# Patient Record
Sex: FEMALE | HISPANIC OR LATINO | Employment: FULL TIME | URBAN - METROPOLITAN AREA
[De-identification: names, ages, dates, MRNs, and addresses within clinical notes are randomized per-mention and may not be internally consistent; named-entity substitution may affect disease eponyms.]

---

## 2017-01-24 ENCOUNTER — HOSPITAL ENCOUNTER (OUTPATIENT)
Dept: RADIOLOGY | Facility: MEDICAL CENTER | Age: 47
End: 2017-01-24
Attending: SURGERY
Payer: COMMERCIAL

## 2017-01-24 DIAGNOSIS — G54.0 THORACIC OUTLET SYNDROME: ICD-10-CM

## 2017-01-24 PROCEDURE — 71020 DX-CHEST-2 VIEWS: CPT

## 2017-01-27 ENCOUNTER — HOSPITAL ENCOUNTER (OUTPATIENT)
Dept: RADIOLOGY | Facility: MEDICAL CENTER | Age: 47
End: 2017-01-27
Attending: INTERNAL MEDICINE
Payer: COMMERCIAL

## 2017-01-27 DIAGNOSIS — R92.8 ABNORMAL MAMMOGRAM: ICD-10-CM

## 2017-01-27 DIAGNOSIS — R52 PAIN: ICD-10-CM

## 2017-01-27 PROCEDURE — G0204 DX MAMMO INCL CAD BI: HCPCS

## 2017-04-12 ENCOUNTER — NON-PROVIDER VISIT (OUTPATIENT)
Dept: URGENT CARE | Facility: PHYSICIAN GROUP | Age: 47
End: 2017-04-12

## 2017-04-12 DIAGNOSIS — Z11.1 SPECIAL SCREENING EXAMINATION FOR RESPIRATORY TUBERCULOSIS: ICD-10-CM

## 2017-04-12 PROCEDURE — 86580 TB INTRADERMAL TEST: CPT | Performed by: PHYSICIAN ASSISTANT

## 2017-04-14 ENCOUNTER — NON-PROVIDER VISIT (OUTPATIENT)
Dept: URGENT CARE | Facility: PHYSICIAN GROUP | Age: 47
End: 2017-04-14

## 2017-04-14 ENCOUNTER — APPOINTMENT (OUTPATIENT)
Dept: RADIOLOGY | Facility: IMAGING CENTER | Age: 47
End: 2017-04-14
Attending: PHYSICIAN ASSISTANT
Payer: COMMERCIAL

## 2017-04-14 DIAGNOSIS — R76.11 POSITIVE TB TEST: ICD-10-CM

## 2017-04-14 LAB — TB WHEAL 3D P 5 TU DIAM: NORMAL MM

## 2017-04-14 PROCEDURE — 71020 DX-CHEST-2 VIEWS: CPT | Mod: TC | Performed by: PHYSICIAN ASSISTANT

## 2017-08-18 ENCOUNTER — OFFICE VISIT (OUTPATIENT)
Dept: URGENT CARE | Facility: PHYSICIAN GROUP | Age: 47
End: 2017-08-18
Payer: COMMERCIAL

## 2017-08-18 VITALS
HEART RATE: 96 BPM | OXYGEN SATURATION: 98 % | SYSTOLIC BLOOD PRESSURE: 118 MMHG | HEIGHT: 62 IN | BODY MASS INDEX: 25.76 KG/M2 | WEIGHT: 140 LBS | TEMPERATURE: 98.4 F | DIASTOLIC BLOOD PRESSURE: 74 MMHG | RESPIRATION RATE: 14 BRPM

## 2017-08-18 DIAGNOSIS — H66.003 ACUTE SUPPURATIVE OTITIS MEDIA OF BOTH EARS WITHOUT SPONTANEOUS RUPTURE OF TYMPANIC MEMBRANES, RECURRENCE NOT SPECIFIED: ICD-10-CM

## 2017-08-18 DIAGNOSIS — J02.9 SORE THROAT: ICD-10-CM

## 2017-08-18 DIAGNOSIS — J06.9 UPPER RESPIRATORY TRACT INFECTION, UNSPECIFIED TYPE: ICD-10-CM

## 2017-08-18 PROCEDURE — 99214 OFFICE O/P EST MOD 30 MIN: CPT | Performed by: PHYSICIAN ASSISTANT

## 2017-08-18 RX ORDER — AMOXICILLIN AND CLAVULANATE POTASSIUM 875; 125 MG/1; MG/1
1 TABLET, FILM COATED ORAL 2 TIMES DAILY
Qty: 14 TAB | Refills: 0 | Status: SHIPPED | OUTPATIENT
Start: 2017-08-18 | End: 2017-08-25

## 2017-08-18 ASSESSMENT — ENCOUNTER SYMPTOMS
DIZZINESS: 0
FEVER: 0
CHILLS: 1
DIARRHEA: 0
VOMITING: 0
SHORTNESS OF BREATH: 0
EYE REDNESS: 0
TINGLING: 0
WHEEZING: 0
MYALGIAS: 0
RHINORRHEA: 1
COUGH: 1
HEADACHES: 0
NECK PAIN: 0
ABDOMINAL PAIN: 0
SPUTUM PRODUCTION: 1
EYE DISCHARGE: 0
SINUS PAIN: 0
SORE THROAT: 1

## 2017-08-18 NOTE — MR AVS SNAPSHOT
"        Radha Marreropravin   2017 6:15 PM   Office Visit   MRN: [id number]    Department:  [facility]   Dept Phone:  [phone]    Description:  Female : 1970   Provider:  Mele Cutler PA-C           Reason for Visit     Sore Throat sore throat; body aches x4 days. Pt feels \"fatigued\"       Allergies as of 2017     No Known Allergies      You were diagnosed with     Acute suppurative otitis media of both ears without spontaneous rupture of tympanic membranes, recurrence not specified   [4909403]       Sore throat   [397524]       Upper respiratory tract infection, unspecified type   [8526954]         Vital Signs     Blood Pressure Pulse Temperature Respirations Height Weight    118/74 mmHg 96 36.9 °C (98.4 °F) 14 1.575 m (5' 2.01\") 63.504 kg (140 lb)    Body Mass Index Oxygen Saturation Smoking Status             25.60 kg/m2 98% Former Smoker         Basic Information     Date Of Birth Sex Race Ethnicity Preferred Language    1970 Female  or   Origin (Puerto Rican,Pakistani,Bahraini,Miguel, etc) English      Your appointments     2018  4:20 PM   MA SCRN10 with [facility] MG 3   [facility] ([address])    [address] [place] [id number] [phone]           No deodorant, powder, perfume or lotion under the arm or breast area.              Problem List              ICD-10-CM Priority Class Noted - Resolved    Chronic neck pain M54.2, G89.29   Unknown - Present    Chronic low back pain M54.5, G89.29   Unknown - Present    Chronic right shoulder pain M25.511, G89.29   Unknown - Present    Fibromyalgia M79.7   Unknown - Present    Displacement of lumbar intervertebral disc without myelopathy M51.26   10/10/2014 - Present    Vitamin D deficiency E55.9   7/15/2015 - Present    Intractable chronic migraine without aura G43.719   2016 - Present      Health Maintenance        Date Due Completion Dates    IMM DTaP/Tdap/Td " Vaccine (1 - Tdap) 3/27/1988 ---    PAP SMEAR 3/27/1990 ---    IMM INFLUENZA (1) 9/1/2017 9/19/2014    MAMMOGRAM 1/27/2018 1/27/2017, 6/27/2016, 12/24/2015, 12/8/2015, 10/4/2010, 3/30/2009, 3/30/2009            Current Immunizations     Influenza TIV (IM) 9/19/2014    Tuberculin Skin Test 4/12/2017      Below and/or attached are the medications your provider expects you to take. Review all of your home medications and newly ordered medications with your provider and/or pharmacist. Follow medication instructions as directed by your provider and/or pharmacist. Please keep your medication list with you and share with your provider. Update the information when medications are discontinued, doses are changed, or new medications (including over-the-counter products) are added; and carry medication information at all times in the event of emergency situations     Allergies:  No Known Allergies          Medications  Valid as of: August 18, 2017 -  6:59 PM    Generic Name Brand Name Tablet Size Instructions for use    Albuterol Sulfate (Aero Soln) albuterol 108 (90 Base) MCG/ACT Inhale 1 Puff by mouth every 6 hours as needed for Shortness of Breath.        Amitriptyline HCl (Tab) ELAVIL 25 MG Take 1 Tab by mouth every bedtime.        Amoxicillin-Pot Clavulanate (Tab) AUGMENTIN 875-125 MG Take 1 Tab by mouth 2 times a day for 7 days.        Azithromycin (Tab) ZITHROMAX 250 MG Take as directed on package. 1 pack.        Benzonatate (Cap) TESSALON 200 MG Take 1 Cap by mouth 3 times a day as needed for Cough.        Fluticasone Propionate (Suspension) FLONASE 50 MCG/ACT Spray 2 Sprays in nose every day.        Loratadine (Tab) CLARITIN 10 MG Take 10 mg by mouth every day.        mag hydrox-al hydrox-simeth-diphenhydrAMINE-lidocaine viscous 2%   Swish, gargle, and spit, one to two teaspoonfuls every six hours as needed. Shake well before using.        .                 Medicines prescribed today were sent to:     [id number] [address] [place] [address]    Phone: [phone] Fax: [phone]    Open 24 Hours?: No      Medication refill instructions:       If your prescription bottle indicates you have medication refills left, it is not necessary to call your provider’s office. Please contact your pharmacy and they will refill your medication.    If your prescription bottle indicates you do not have any refills left, you may request refills at any time through one of the following ways: The online [facility] system (except Urgent Care), by calling your provider’s office, or by asking your pharmacy to contact your provider’s office with a refill request. Medication refills are processed only during regular business hours and may not be available until the next business day. Your provider may request additional information or to have a follow-up visit with you prior to refilling your medication.   *Please Note: Medication refills are assigned a new Rx number when refilled electronically. Your pharmacy may indicate that no refills were authorized even though a new prescription for the same medication is available at the pharmacy. Please request the medicine by name with the pharmacy before contacting your provider for a refill.           [facility]ways Access Code: Activation code not generated  Current [facility]ways Status: Active

## 2017-08-19 NOTE — PROGRESS NOTES
"Subjective:      Radha Blank is a 47 y.o. female who presents with Sore Throat            URI   This is a new problem. Episode onset: 1 week. The problem has been gradually worsening. There has been no fever. Associated symptoms include congestion, coughing, ear pain, a plugged ear sensation, rhinorrhea and a sore throat. Pertinent negatives include no abdominal pain, chest pain, diarrhea, dysuria, headaches, joint pain, joint swelling, neck pain, rash, sinus pain, sneezing, vomiting or wheezing. She has tried NSAIDs and increased fluids for the symptoms. The treatment provided mild relief.       Review of Systems   Constitutional: Positive for chills and malaise/fatigue. Negative for fever.   HENT: Positive for congestion, ear pain, rhinorrhea and sore throat. Negative for ear discharge and sneezing.    Eyes: Negative for discharge and redness.   Respiratory: Positive for cough and sputum production. Negative for shortness of breath and wheezing.    Cardiovascular: Negative for chest pain and leg swelling.   Gastrointestinal: Negative for vomiting, abdominal pain and diarrhea.   Genitourinary: Negative for dysuria and urgency.   Musculoskeletal: Negative for myalgias, joint pain and neck pain.   Skin: Negative for itching and rash.   Neurological: Negative for dizziness, tingling and headaches.          Objective:     /74 mmHg  Pulse 96  Temp(Src) 36.9 °C (98.4 °F)  Resp 14  Ht 1.575 m (5' 2.01\")  Wt 63.504 kg (140 lb)  BMI 25.60 kg/m2  SpO2 98%   PMH:  has a past medical history of Fibromyalgia; Anxiety; Depression; Arrhythmia; Arthritis; Headache(784.0); Tuberculosis (1990s); Ulcer (CMS-HCC); and Urinary tract infection, site not specified. She also has no past medical history of Diabetes, CATARACT, Angina, ASTHMA, COPD, Dialysis, Anesthesia, Heart burn, Indigestion, Pap smear, Allergy, unspecified not elsewhere classified, Anemia, Asthma, Blood transfusion, without reported diagnosis, " Unspecified cataract, CHF (congestive heart failure) (CMS-HCC), Clotting disorder (CMS-HCC), Chronic airway obstruction, not elsewhere classified (CMS-HCC), Emphysema, Diabetic neuropathy (CMS-HCC), Type II or unspecified type diabetes mellitus without mention of complication, not stated as uncontrolled, GERD (gastroesophageal reflux disease), Glaucoma, Goiter, Headache, classical migraine, Heart attack (CMS-HCC), Heart murmur, Asymptomatic human immunodeficiency virus (HIV) infection status (CMS-HCC), Hyperlipidemia, IBD (inflammatory bowel disease), Hypertension, Kidney disease, Meningitis, Seizure (CMS-HCC), Stroke (CMS-HCC), Substance abuse, or Thyroid disease.  MEDS:   Current outpatient prescriptions:   •  amoxicillin-clavulanate (AUGMENTIN) 875-125 MG Tab, Take 1 Tab by mouth 2 times a day for 7 days., Disp: 14 Tab, Rfl: 0  •  mag hydrox-al hydrox-simeth-diphenhydrAMINE-lidocaine viscous 2%, Swish, gargle, and spit, one to two teaspoonfuls every six hours as needed. Shake well before using., Disp: 120 mL, Rfl: 0  •  azithromycin (ZITHROMAX) 250 MG Tab, Take as directed on package. 1 pack., Disp: 6 Tab, Rfl: 0  •  albuterol 108 (90 BASE) MCG/ACT Aero Soln inhalation aerosol, Inhale 1 Puff by mouth every 6 hours as needed for Shortness of Breath., Disp: 8.5 g, Rfl: 0  •  benzonatate (TESSALON) 200 MG capsule, Take 1 Cap by mouth 3 times a day as needed for Cough., Disp: 30 Cap, Rfl: 0  •  loratadine (CLARITIN) 10 MG Tab, Take 10 mg by mouth every day., Disp: , Rfl:   •  amitriptyline (ELAVIL) 25 MG Tab, Take 1 Tab by mouth every bedtime., Disp: 60 Tab, Rfl: 3  •  fluticasone (FLONASE) 50 MCG/ACT nasal spray, Spray 2 Sprays in nose every day., Disp: 1 Bottle, Rfl: 0  ALLERGIES: No Known Allergies  SURGHX:   Past Surgical History   Procedure Laterality Date   • Vaginal hysterectomy scope total  8/31/2009     Performed by UNRULY GARCIA at SURGERY SAME DAY [facility] ORS   • Other  1993     nasal bone fracture    • Abdominal exploration     • Rhinoplasty  1991   • Cervical disk and fusion anterior  2005     Dr. Charles due to herniated disc C5-6   • Lumbar laminectomy diskectomy  10/10/2014     Performed by Gisselle Escobar M.D. at SURGERY [facility]   • Us-needle core bx-breast panel       SOCHX:  reports that she quit smoking about 4 years ago. Her smoking use included Cigarettes. She has a .125 pack-year smoking history. She has never used smokeless tobacco. She reports that she drinks alcohol. She reports that she does not use illicit drugs.  FH: Family history was reviewed, no pertinent findings to report    Physical Exam   Constitutional: She is oriented to person, place, and time. She appears well-developed and well-nourished.   HENT:   Head: Normocephalic and atraumatic.   Mouth/Throat: No oropharyngeal exudate.   Ears- Canals clear-Bilateral TM- with erythema, bulging and noted purulent discharge noted.   Pos. PND, with slight erythema- without tonsillar edema or exudate.   Mild discharge noted bilaterally- to nares.      Eyes: EOM are normal. Pupils are equal, round, and reactive to light.   Neck: Normal range of motion. Neck supple.   Cardiovascular: Normal rate and regular rhythm.    No murmur heard.  Pulmonary/Chest: Effort normal and breath sounds normal. No respiratory distress.   Musculoskeletal: Normal range of motion. She exhibits no tenderness.   Lymphadenopathy:     She has no cervical adenopathy.   Neurological: She is alert and oriented to person, place, and time.   Skin: Skin is warm. No rash noted.   Psychiatric: She has a normal mood and affect. Her behavior is normal.   Vitals reviewed.              Assessment/Plan:     1. Acute suppurative otitis media of both ears without spontaneous rupture of tympanic membranes, recurrence not specified  - amoxicillin-clavulanate (AUGMENTIN) 875-125 MG Tab; Take 1 Tab by mouth 2 times a day for 7 days.  Dispense: 14 Tab; Refill: 0    2. Sore throat  -  amoxicillin-clavulanate (AUGMENTIN) 875-125 MG Tab; Take 1 Tab by mouth 2 times a day for 7 days.  Dispense: 14 Tab; Refill: 0  - mag hydrox-al hydrox-simeth-diphenhydrAMINE-lidocaine viscous 2%; Swish, gargle, and spit, one to two teaspoonfuls every six hours as needed. Shake well before using.  Dispense: 120 mL; Refill: 0    3. Upper respiratory tract infection, unspecified type    Discussed viral nature of URI- due to bilateral ear infections- pt. Did want to trial watch and wait method she has done better with Augmentin in the past. Other supportive therapies encouraged.   Increase fluids, avoid night time dairy. Humidification.  Patient given precautionary s/sx that mandate immediate follow up and evaluation in the ED. Advised of risks of not doing so.    DDX, Supportive care, and indications for immediate follow-up discussed with patient.    Instructed to return to clinic or nearest emergency department if we are not available for any change in condition, further concerns, or worsening of symptoms.    The patient demonstrated a good understanding and agreed with the treatment plan.

## 2017-10-30 ENCOUNTER — HOSPITAL ENCOUNTER (OUTPATIENT)
Dept: LAB | Facility: MEDICAL CENTER | Age: 47
End: 2017-10-30
Attending: NURSE PRACTITIONER
Payer: COMMERCIAL

## 2017-10-30 LAB
ALBUMIN SERPL BCP-MCNC: 3.9 G/DL (ref 3.2–4.9)
ALBUMIN/GLOB SERPL: 1.1 G/DL
ALP SERPL-CCNC: 60 U/L (ref 30–99)
ALT SERPL-CCNC: 12 U/L (ref 2–50)
ANION GAP SERPL CALC-SCNC: 7 MMOL/L (ref 0–11.9)
AST SERPL-CCNC: 18 U/L (ref 12–45)
BASOPHILS # BLD AUTO: 1 % (ref 0–1.8)
BASOPHILS # BLD: 0.05 K/UL (ref 0–0.12)
BILIRUB SERPL-MCNC: 0.5 MG/DL (ref 0.1–1.5)
BUN SERPL-MCNC: 14 MG/DL (ref 8–22)
CALCIUM SERPL-MCNC: 8.7 MG/DL (ref 8.5–10.5)
CHLORIDE SERPL-SCNC: 104 MMOL/L (ref 96–112)
CHOLEST SERPL-MCNC: 161 MG/DL (ref 100–199)
CO2 SERPL-SCNC: 25 MMOL/L (ref 20–33)
CREAT SERPL-MCNC: 0.57 MG/DL (ref 0.5–1.4)
EOSINOPHIL # BLD AUTO: 0.22 K/UL (ref 0–0.51)
EOSINOPHIL NFR BLD: 4.3 % (ref 0–6.9)
ERYTHROCYTE [DISTWIDTH] IN BLOOD BY AUTOMATED COUNT: 47.8 FL (ref 35.9–50)
FSH SERPL-ACNC: 6.8 MIU/ML
GFR SERPL CREATININE-BSD FRML MDRD: >60 ML/MIN/1.73 M 2
GLOBULIN SER CALC-MCNC: 3.6 G/DL (ref 1.9–3.5)
GLUCOSE SERPL-MCNC: 94 MG/DL (ref 65–99)
HCT VFR BLD AUTO: 37.2 % (ref 37–47)
HDLC SERPL-MCNC: 48 MG/DL
HGB BLD-MCNC: 12 G/DL (ref 12–16)
IMM GRANULOCYTES # BLD AUTO: 0.01 K/UL (ref 0–0.11)
IMM GRANULOCYTES NFR BLD AUTO: 0.2 % (ref 0–0.9)
LDLC SERPL CALC-MCNC: 95 MG/DL
LH SERPL-ACNC: 6 IU/L
LYMPHOCYTES # BLD AUTO: 1.82 K/UL (ref 1–4.8)
LYMPHOCYTES NFR BLD: 35.5 % (ref 22–41)
MCH RBC QN AUTO: 29.3 PG (ref 27–33)
MCHC RBC AUTO-ENTMCNC: 32.3 G/DL (ref 33.6–35)
MCV RBC AUTO: 90.7 FL (ref 81.4–97.8)
MONOCYTES # BLD AUTO: 0.39 K/UL (ref 0–0.85)
MONOCYTES NFR BLD AUTO: 7.6 % (ref 0–13.4)
NEUTROPHILS # BLD AUTO: 2.64 K/UL (ref 2–7.15)
NEUTROPHILS NFR BLD: 51.4 % (ref 44–72)
NRBC # BLD AUTO: 0 K/UL
NRBC BLD AUTO-RTO: 0 /100 WBC
PLATELET # BLD AUTO: 161 K/UL (ref 164–446)
PMV BLD AUTO: 13.4 FL (ref 9–12.9)
POTASSIUM SERPL-SCNC: 3.8 MMOL/L (ref 3.6–5.5)
PROT SERPL-MCNC: 7.5 G/DL (ref 6–8.2)
RBC # BLD AUTO: 4.1 M/UL (ref 4.2–5.4)
SODIUM SERPL-SCNC: 136 MMOL/L (ref 135–145)
T3FREE SERPL-MCNC: 3.58 PG/ML (ref 2.4–4.2)
T4 FREE SERPL-MCNC: 0.73 NG/DL (ref 0.53–1.43)
TRIGL SERPL-MCNC: 89 MG/DL (ref 0–149)
TSH SERPL DL<=0.005 MIU/L-ACNC: 2.07 UIU/ML (ref 0.3–3.7)
WBC # BLD AUTO: 5.1 K/UL (ref 4.8–10.8)

## 2017-10-30 PROCEDURE — 85025 COMPLETE CBC W/AUTO DIFF WBC: CPT

## 2017-10-30 PROCEDURE — 83001 ASSAY OF GONADOTROPIN (FSH): CPT

## 2017-10-30 PROCEDURE — 83002 ASSAY OF GONADOTROPIN (LH): CPT

## 2017-10-30 PROCEDURE — 36415 COLL VENOUS BLD VENIPUNCTURE: CPT

## 2017-10-30 PROCEDURE — 82671 ASSAY OF ESTROGENS: CPT

## 2017-10-30 PROCEDURE — 86800 THYROGLOBULIN ANTIBODY: CPT

## 2017-10-30 PROCEDURE — 80061 LIPID PANEL: CPT

## 2017-10-30 PROCEDURE — 80053 COMPREHEN METABOLIC PANEL: CPT

## 2017-10-30 PROCEDURE — 84439 ASSAY OF FREE THYROXINE: CPT

## 2017-10-30 PROCEDURE — 84443 ASSAY THYROID STIM HORMONE: CPT

## 2017-10-30 PROCEDURE — 84481 FREE ASSAY (FT-3): CPT

## 2017-10-31 LAB — THYROGLOB AB SERPL-ACNC: <0.9 IU/ML (ref 0–4)

## 2017-11-02 LAB
ESTRADIOL SERPL HS-MCNC: 382 PG/ML
ESTROGEN SERPL CALC-MCNC: 551 PG/ML
ESTRONE SERPL-MCNC: 169 PG/ML

## 2018-01-31 ENCOUNTER — HOSPITAL ENCOUNTER (OUTPATIENT)
Dept: RADIOLOGY | Facility: MEDICAL CENTER | Age: 48
End: 2018-01-31
Attending: INTERNAL MEDICINE
Payer: COMMERCIAL

## 2018-01-31 DIAGNOSIS — Z12.31 ENCOUNTER FOR SCREENING MAMMOGRAM FOR MALIGNANT NEOPLASM OF BREAST: ICD-10-CM

## 2018-01-31 PROCEDURE — 77067 SCR MAMMO BI INCL CAD: CPT

## 2018-02-02 ENCOUNTER — HOSPITAL ENCOUNTER (OUTPATIENT)
Dept: RADIOLOGY | Facility: MEDICAL CENTER | Age: 48
End: 2018-02-02
Attending: PHYSICIAN ASSISTANT
Payer: COMMERCIAL

## 2018-02-02 ENCOUNTER — HOSPITAL ENCOUNTER (OUTPATIENT)
Dept: LAB | Facility: MEDICAL CENTER | Age: 48
End: 2018-02-02
Attending: PHYSICIAN ASSISTANT
Payer: COMMERCIAL

## 2018-02-02 DIAGNOSIS — M54.2 CERVICALGIA: ICD-10-CM

## 2018-02-02 DIAGNOSIS — M54.40 ACUTE RIGHT-SIDED LOW BACK PAIN WITH SCIATICA, SCIATICA LATERALITY UNSPECIFIED: ICD-10-CM

## 2018-02-02 LAB
APPEARANCE UR: CLEAR
BACTERIA #/AREA URNS HPF: NEGATIVE /HPF
BASOPHILS # BLD AUTO: 0.4 % (ref 0–1.8)
BASOPHILS # BLD: 0.03 K/UL (ref 0–0.12)
BILIRUB UR QL STRIP.AUTO: NEGATIVE
COLOR UR: YELLOW
CULTURE IF INDICATED INDCX: YES UA CULTURE
EOSINOPHIL # BLD AUTO: 0.13 K/UL (ref 0–0.51)
EOSINOPHIL NFR BLD: 1.9 % (ref 0–6.9)
EPI CELLS #/AREA URNS HPF: ABNORMAL /HPF
ERYTHROCYTE [DISTWIDTH] IN BLOOD BY AUTOMATED COUNT: 46.4 FL (ref 35.9–50)
GLUCOSE UR STRIP.AUTO-MCNC: NEGATIVE MG/DL
HCT VFR BLD AUTO: 44.7 % (ref 37–47)
HGB BLD-MCNC: 13.9 G/DL (ref 12–16)
HYALINE CASTS #/AREA URNS LPF: ABNORMAL /LPF
IMM GRANULOCYTES # BLD AUTO: 0.01 K/UL (ref 0–0.11)
IMM GRANULOCYTES NFR BLD AUTO: 0.1 % (ref 0–0.9)
KETONES UR STRIP.AUTO-MCNC: NEGATIVE MG/DL
LEUKOCYTE ESTERASE UR QL STRIP.AUTO: ABNORMAL
LYMPHOCYTES # BLD AUTO: 2.83 K/UL (ref 1–4.8)
LYMPHOCYTES NFR BLD: 41.8 % (ref 22–41)
MCH RBC QN AUTO: 27.1 PG (ref 27–33)
MCHC RBC AUTO-ENTMCNC: 31.1 G/DL (ref 33.6–35)
MCV RBC AUTO: 87.1 FL (ref 81.4–97.8)
MICRO URNS: ABNORMAL
MONOCYTES # BLD AUTO: 0.34 K/UL (ref 0–0.85)
MONOCYTES NFR BLD AUTO: 5 % (ref 0–13.4)
NEUTROPHILS # BLD AUTO: 3.43 K/UL (ref 2–7.15)
NEUTROPHILS NFR BLD: 50.8 % (ref 44–72)
NITRITE UR QL STRIP.AUTO: NEGATIVE
NRBC # BLD AUTO: 0 K/UL
NRBC BLD-RTO: 0 /100 WBC
PH UR STRIP.AUTO: 6.5 [PH]
PLATELET # BLD AUTO: 172 K/UL (ref 164–446)
PMV BLD AUTO: 13.7 FL (ref 9–12.9)
PROT UR QL STRIP: NEGATIVE MG/DL
RBC # BLD AUTO: 5.13 M/UL (ref 4.2–5.4)
RBC # URNS HPF: ABNORMAL /HPF
RBC UR QL AUTO: NEGATIVE
RHEUMATOID FACT SER IA-ACNC: <10 IU/ML (ref 0–14)
SP GR UR STRIP.AUTO: 1.02
URATE SERPL-MCNC: 5 MG/DL (ref 1.9–8.2)
UROBILINOGEN UR STRIP.AUTO-MCNC: 0.2 MG/DL
WBC # BLD AUTO: 6.8 K/UL (ref 4.8–10.8)
WBC #/AREA URNS HPF: ABNORMAL /HPF

## 2018-02-02 PROCEDURE — 36415 COLL VENOUS BLD VENIPUNCTURE: CPT

## 2018-02-02 PROCEDURE — 72040 X-RAY EXAM NECK SPINE 2-3 VW: CPT

## 2018-02-02 PROCEDURE — 85025 COMPLETE CBC W/AUTO DIFF WBC: CPT

## 2018-02-02 PROCEDURE — 87086 URINE CULTURE/COLONY COUNT: CPT

## 2018-02-02 PROCEDURE — 86431 RHEUMATOID FACTOR QUANT: CPT

## 2018-02-02 PROCEDURE — 84550 ASSAY OF BLOOD/URIC ACID: CPT

## 2018-02-02 PROCEDURE — 86235 NUCLEAR ANTIGEN ANTIBODY: CPT | Mod: 91

## 2018-02-02 PROCEDURE — 72100 X-RAY EXAM L-S SPINE 2/3 VWS: CPT

## 2018-02-02 PROCEDURE — 81001 URINALYSIS AUTO W/SCOPE: CPT

## 2018-02-02 PROCEDURE — 86038 ANTINUCLEAR ANTIBODIES: CPT

## 2018-02-02 PROCEDURE — 86225 DNA ANTIBODY NATIVE: CPT

## 2018-02-04 LAB
BACTERIA UR CULT: NORMAL
SIGNIFICANT IND 70042: NORMAL
SITE SITE: NORMAL
SOURCE SOURCE: NORMAL

## 2018-02-05 LAB — NUCLEAR IGG SER QL IA: NORMAL

## 2018-02-07 ENCOUNTER — TELEPHONE (OUTPATIENT)
Dept: MEDICAL GROUP | Facility: PHYSICIAN GROUP | Age: 48
End: 2018-02-07

## 2018-02-07 NOTE — TELEPHONE ENCOUNTER
----- Message from Enriqueta Villanueva D.O. sent at 2/6/2018 10:42 PM PST -----  Please inform patient that her recent mammogram is negative for obvious abnormalities, but she has dense breasts which can lower sensitivity of the test. Recommend routine screening in one year.  There is additional breast imaging, Sonocine, that can be performed in addition to the mammogram to further evaluate those with dense breasts. Unfortunately, most insurances may not cover this exam.  If she is interested in this test, please advise her to contact her PCP to discuss this (she may need to establish with a new provider if she has not yet).

## 2018-05-16 ENCOUNTER — HOSPITAL ENCOUNTER (OUTPATIENT)
Dept: RADIOLOGY | Facility: MEDICAL CENTER | Age: 48
End: 2018-05-16
Attending: PHYSICIAN ASSISTANT
Payer: COMMERCIAL

## 2018-05-16 DIAGNOSIS — R10.10 UPPER ABDOMINAL PAIN: ICD-10-CM

## 2018-05-16 PROCEDURE — 71101 X-RAY EXAM UNILAT RIBS/CHEST: CPT | Mod: LT

## 2018-06-15 ENCOUNTER — APPOINTMENT (OUTPATIENT)
Dept: RADIOLOGY | Facility: MEDICAL CENTER | Age: 48
End: 2018-06-15
Attending: PHYSICIAN ASSISTANT
Payer: COMMERCIAL

## 2018-06-16 ENCOUNTER — HOSPITAL ENCOUNTER (OUTPATIENT)
Dept: LAB | Facility: MEDICAL CENTER | Age: 48
End: 2018-06-16
Attending: DERMATOLOGY
Payer: COMMERCIAL

## 2018-06-16 LAB
CHOLEST SERPL-MCNC: 192 MG/DL (ref 100–199)
HDLC SERPL-MCNC: 56 MG/DL
LDLC SERPL CALC-MCNC: 121 MG/DL
TRIGL SERPL-MCNC: 74 MG/DL (ref 0–149)

## 2018-06-16 PROCEDURE — 36415 COLL VENOUS BLD VENIPUNCTURE: CPT

## 2018-06-16 PROCEDURE — 80061 LIPID PANEL: CPT

## 2018-10-22 ENCOUNTER — HOSPITAL ENCOUNTER (OUTPATIENT)
Dept: RADIOLOGY | Facility: MEDICAL CENTER | Age: 48
End: 2018-10-22
Attending: INTERNAL MEDICINE
Payer: COMMERCIAL

## 2018-10-22 DIAGNOSIS — J01.90 ACUTE SINUSITIS, RECURRENCE NOT SPECIFIED, UNSPECIFIED LOCATION: ICD-10-CM

## 2018-10-22 PROCEDURE — 70210 X-RAY EXAM OF SINUSES: CPT

## 2019-02-11 ENCOUNTER — HOSPITAL ENCOUNTER (OUTPATIENT)
Dept: RADIOLOGY | Facility: MEDICAL CENTER | Age: 49
End: 2019-02-11
Attending: PHYSICIAN ASSISTANT
Payer: COMMERCIAL

## 2019-02-11 DIAGNOSIS — N63.21 MASS OF UPPER OUTER QUADRANT OF LEFT BREAST: ICD-10-CM

## 2019-02-11 PROCEDURE — G0279 TOMOSYNTHESIS, MAMMO: HCPCS

## 2019-02-11 PROCEDURE — 76642 ULTRASOUND BREAST LIMITED: CPT | Mod: LT

## 2019-03-19 ENCOUNTER — HOSPITAL ENCOUNTER (OUTPATIENT)
Dept: RADIOLOGY | Facility: MEDICAL CENTER | Age: 49
End: 2019-03-19
Attending: PHYSICIAN ASSISTANT
Payer: COMMERCIAL

## 2019-03-19 DIAGNOSIS — M54.2 CERVICALGIA: ICD-10-CM

## 2019-03-19 PROCEDURE — 72040 X-RAY EXAM NECK SPINE 2-3 VW: CPT

## 2019-10-04 ENCOUNTER — HOSPITAL ENCOUNTER (OUTPATIENT)
Dept: RADIOLOGY | Facility: MEDICAL CENTER | Age: 49
End: 2019-10-04
Attending: NURSE PRACTITIONER
Payer: COMMERCIAL

## 2019-10-04 DIAGNOSIS — M54.14 THORACIC NEURITIS: ICD-10-CM

## 2019-10-04 PROCEDURE — 72148 MRI LUMBAR SPINE W/O DYE: CPT

## 2020-03-07 ENCOUNTER — HOSPITAL ENCOUNTER (OUTPATIENT)
Dept: LAB | Facility: MEDICAL CENTER | Age: 50
End: 2020-03-07
Attending: FAMILY MEDICINE
Payer: COMMERCIAL

## 2020-03-07 LAB
ALBUMIN SERPL BCP-MCNC: 4.4 G/DL (ref 3.2–4.9)
ALBUMIN/GLOB SERPL: 1 G/DL
ALP SERPL-CCNC: 83 U/L (ref 30–99)
ALT SERPL-CCNC: 16 U/L (ref 2–50)
ANION GAP SERPL CALC-SCNC: 7 MMOL/L (ref 0–11.9)
AST SERPL-CCNC: 17 U/L (ref 12–45)
BASOPHILS # BLD AUTO: 1.2 % (ref 0–1.8)
BASOPHILS # BLD: 0.05 K/UL (ref 0–0.12)
BILIRUB SERPL-MCNC: 0.7 MG/DL (ref 0.1–1.5)
BUN SERPL-MCNC: 14 MG/DL (ref 8–22)
CALCIUM SERPL-MCNC: 9.9 MG/DL (ref 8.5–10.5)
CHLORIDE SERPL-SCNC: 104 MMOL/L (ref 96–112)
CHOLEST SERPL-MCNC: 211 MG/DL (ref 100–199)
CO2 SERPL-SCNC: 30 MMOL/L (ref 20–33)
CREAT SERPL-MCNC: 0.73 MG/DL (ref 0.5–1.4)
EOSINOPHIL # BLD AUTO: 0.11 K/UL (ref 0–0.51)
EOSINOPHIL NFR BLD: 2.5 % (ref 0–6.9)
ERYTHROCYTE [DISTWIDTH] IN BLOOD BY AUTOMATED COUNT: 48.8 FL (ref 35.9–50)
FASTING STATUS PATIENT QL REPORTED: NORMAL
FERRITIN SERPL-MCNC: 58.1 NG/ML (ref 10–291)
FOLATE SERPL-MCNC: 13.3 NG/ML
GLOBULIN SER CALC-MCNC: 4.4 G/DL (ref 1.9–3.5)
GLUCOSE SERPL-MCNC: 101 MG/DL (ref 65–99)
HCT VFR BLD AUTO: 43.6 % (ref 37–47)
HDLC SERPL-MCNC: 45 MG/DL
HGB BLD-MCNC: 13.7 G/DL (ref 12–16)
IMM GRANULOCYTES # BLD AUTO: 0.01 K/UL (ref 0–0.11)
IMM GRANULOCYTES NFR BLD AUTO: 0.2 % (ref 0–0.9)
IRON SATN MFR SERPL: 41 % (ref 15–55)
IRON SERPL-MCNC: 113 UG/DL (ref 40–170)
LDLC SERPL CALC-MCNC: 141 MG/DL
LYMPHOCYTES # BLD AUTO: 2.26 K/UL (ref 1–4.8)
LYMPHOCYTES NFR BLD: 52.3 % (ref 22–41)
MCH RBC QN AUTO: 28.5 PG (ref 27–33)
MCHC RBC AUTO-ENTMCNC: 31.4 G/DL (ref 33.6–35)
MCV RBC AUTO: 90.8 FL (ref 81.4–97.8)
MONOCYTES # BLD AUTO: 0.3 K/UL (ref 0–0.85)
MONOCYTES NFR BLD AUTO: 6.9 % (ref 0–13.4)
NEUTROPHILS # BLD AUTO: 1.59 K/UL (ref 2–7.15)
NEUTROPHILS NFR BLD: 36.9 % (ref 44–72)
NRBC # BLD AUTO: 0 K/UL
NRBC BLD-RTO: 0 /100 WBC
PLATELET # BLD AUTO: 181 K/UL (ref 164–446)
PMV BLD AUTO: 14.9 FL (ref 9–12.9)
POTASSIUM SERPL-SCNC: 4.1 MMOL/L (ref 3.6–5.5)
PROT SERPL-MCNC: 8.8 G/DL (ref 6–8.2)
RBC # BLD AUTO: 4.8 M/UL (ref 4.2–5.4)
SODIUM SERPL-SCNC: 141 MMOL/L (ref 135–145)
T3FREE SERPL-MCNC: 3.53 PG/ML (ref 2.4–4.2)
T4 FREE SERPL-MCNC: 0.87 NG/DL (ref 0.53–1.43)
TIBC SERPL-MCNC: 279 UG/DL (ref 250–450)
TRANSFERRIN SERPL-MCNC: 193 MG/DL (ref 200–370)
TRIGL SERPL-MCNC: 125 MG/DL (ref 0–149)
TSH SERPL DL<=0.005 MIU/L-ACNC: 1.28 UIU/ML (ref 0.38–5.33)
VIT B12 SERPL-MCNC: 537 PG/ML (ref 211–911)
WBC # BLD AUTO: 4.3 K/UL (ref 4.8–10.8)

## 2020-03-07 PROCEDURE — 82728 ASSAY OF FERRITIN: CPT

## 2020-03-07 PROCEDURE — 84443 ASSAY THYROID STIM HORMONE: CPT

## 2020-03-07 PROCEDURE — 82746 ASSAY OF FOLIC ACID SERUM: CPT

## 2020-03-07 PROCEDURE — 82607 VITAMIN B-12: CPT

## 2020-03-07 PROCEDURE — 80053 COMPREHEN METABOLIC PANEL: CPT

## 2020-03-07 PROCEDURE — 84481 FREE ASSAY (FT-3): CPT

## 2020-03-07 PROCEDURE — 84466 ASSAY OF TRANSFERRIN: CPT

## 2020-03-07 PROCEDURE — 83540 ASSAY OF IRON: CPT

## 2020-03-07 PROCEDURE — 84439 ASSAY OF FREE THYROXINE: CPT

## 2020-03-07 PROCEDURE — 83550 IRON BINDING TEST: CPT

## 2020-03-07 PROCEDURE — 85025 COMPLETE CBC W/AUTO DIFF WBC: CPT

## 2020-03-07 PROCEDURE — 80061 LIPID PANEL: CPT

## 2020-03-07 PROCEDURE — 36415 COLL VENOUS BLD VENIPUNCTURE: CPT

## 2020-04-18 ENCOUNTER — HOSPITAL ENCOUNTER (OUTPATIENT)
Dept: LAB | Facility: MEDICAL CENTER | Age: 50
End: 2020-04-18
Attending: PHYSICIAN ASSISTANT
Payer: COMMERCIAL

## 2020-04-18 LAB
BASOPHILS # BLD AUTO: 0.7 % (ref 0–1.8)
BASOPHILS # BLD: 0.04 K/UL (ref 0–0.12)
EOSINOPHIL # BLD AUTO: 0.24 K/UL (ref 0–0.51)
EOSINOPHIL NFR BLD: 4.2 % (ref 0–6.9)
ERYTHROCYTE [DISTWIDTH] IN BLOOD BY AUTOMATED COUNT: 44.7 FL (ref 35.9–50)
EST. AVERAGE GLUCOSE BLD GHB EST-MCNC: 123 MG/DL
FASTING STATUS PATIENT QL REPORTED: NORMAL
GLUCOSE SERPL-MCNC: 95 MG/DL (ref 65–99)
HBA1C MFR BLD: 5.9 % (ref 0–5.6)
HCT VFR BLD AUTO: 43.4 % (ref 37–47)
HGB BLD-MCNC: 14.2 G/DL (ref 12–16)
IMM GRANULOCYTES # BLD AUTO: 0.01 K/UL (ref 0–0.11)
IMM GRANULOCYTES NFR BLD AUTO: 0.2 % (ref 0–0.9)
LYMPHOCYTES # BLD AUTO: 2.96 K/UL (ref 1–4.8)
LYMPHOCYTES NFR BLD: 51.9 % (ref 22–41)
MCH RBC QN AUTO: 28.8 PG (ref 27–33)
MCHC RBC AUTO-ENTMCNC: 32.7 G/DL (ref 33.6–35)
MCV RBC AUTO: 88 FL (ref 81.4–97.8)
MONOCYTES # BLD AUTO: 0.4 K/UL (ref 0–0.85)
MONOCYTES NFR BLD AUTO: 7 % (ref 0–13.4)
NEUTROPHILS # BLD AUTO: 2.05 K/UL (ref 2–7.15)
NEUTROPHILS NFR BLD: 36 % (ref 44–72)
NRBC # BLD AUTO: 0 K/UL
NRBC BLD-RTO: 0 /100 WBC
PLATELET # BLD AUTO: 170 K/UL (ref 164–446)
PMV BLD AUTO: 12.9 FL (ref 9–12.9)
RBC # BLD AUTO: 4.93 M/UL (ref 4.2–5.4)
WBC # BLD AUTO: 5.7 K/UL (ref 4.8–10.8)

## 2020-04-18 PROCEDURE — 83036 HEMOGLOBIN GLYCOSYLATED A1C: CPT

## 2020-04-18 PROCEDURE — 82947 ASSAY GLUCOSE BLOOD QUANT: CPT

## 2020-04-18 PROCEDURE — 85025 COMPLETE CBC W/AUTO DIFF WBC: CPT

## 2020-04-18 PROCEDURE — 36415 COLL VENOUS BLD VENIPUNCTURE: CPT

## 2020-08-11 ENCOUNTER — HOSPITAL ENCOUNTER (OUTPATIENT)
Dept: RADIOLOGY | Facility: MEDICAL CENTER | Age: 50
End: 2020-08-11
Attending: PHYSICIAN ASSISTANT
Payer: COMMERCIAL

## 2020-08-11 DIAGNOSIS — R10.9 ABDOMINAL PAIN, UNSPECIFIED ABDOMINAL LOCATION: ICD-10-CM

## 2020-08-11 DIAGNOSIS — R06.89 ABNORMAL BREATH SOUNDS: ICD-10-CM

## 2020-08-11 PROCEDURE — 71046 X-RAY EXAM CHEST 2 VIEWS: CPT

## 2020-08-15 ENCOUNTER — HOSPITAL ENCOUNTER (OUTPATIENT)
Dept: LAB | Facility: MEDICAL CENTER | Age: 50
End: 2020-08-15
Attending: PHYSICIAN ASSISTANT
Payer: COMMERCIAL

## 2020-08-15 LAB
ALBUMIN SERPL BCP-MCNC: 4.2 G/DL (ref 3.2–4.9)
ALBUMIN/GLOB SERPL: 1.1 G/DL
ALP SERPL-CCNC: 95 U/L (ref 30–99)
ALT SERPL-CCNC: 20 U/L (ref 2–50)
AMYLASE SERPL-CCNC: 68 U/L (ref 20–103)
ANION GAP SERPL CALC-SCNC: 13 MMOL/L (ref 7–16)
AST SERPL-CCNC: 24 U/L (ref 12–45)
BASOPHILS # BLD AUTO: 0.8 % (ref 0–1.8)
BASOPHILS # BLD: 0.04 K/UL (ref 0–0.12)
BILIRUB SERPL-MCNC: 0.6 MG/DL (ref 0.1–1.5)
BUN SERPL-MCNC: 13 MG/DL (ref 8–22)
CALCIUM SERPL-MCNC: 9.4 MG/DL (ref 8.5–10.5)
CHLORIDE SERPL-SCNC: 100 MMOL/L (ref 96–112)
CHOLEST SERPL-MCNC: 227 MG/DL (ref 100–199)
CO2 SERPL-SCNC: 25 MMOL/L (ref 20–33)
CREAT SERPL-MCNC: 0.6 MG/DL (ref 0.5–1.4)
EOSINOPHIL # BLD AUTO: 0.13 K/UL (ref 0–0.51)
EOSINOPHIL NFR BLD: 2.4 % (ref 0–6.9)
ERYTHROCYTE [DISTWIDTH] IN BLOOD BY AUTOMATED COUNT: 45.4 FL (ref 35.9–50)
ERYTHROCYTE [SEDIMENTATION RATE] IN BLOOD BY WESTERGREN METHOD: 29 MM/HOUR (ref 0–30)
EST. AVERAGE GLUCOSE BLD GHB EST-MCNC: 140 MG/DL
FASTING STATUS PATIENT QL REPORTED: NORMAL
GLOBULIN SER CALC-MCNC: 3.8 G/DL (ref 1.9–3.5)
GLUCOSE SERPL-MCNC: 89 MG/DL (ref 65–99)
HAV IGM SERPL QL IA: NORMAL
HBA1C MFR BLD: 6.5 % (ref 0–5.6)
HBV CORE IGM SER QL: NORMAL
HBV SURFACE AG SER QL: NORMAL
HCT VFR BLD AUTO: 42.5 % (ref 37–47)
HCV AB SER QL: NORMAL
HDLC SERPL-MCNC: 45 MG/DL
HGB BLD-MCNC: 13.7 G/DL (ref 12–16)
IMM GRANULOCYTES # BLD AUTO: 0.01 K/UL (ref 0–0.11)
IMM GRANULOCYTES NFR BLD AUTO: 0.2 % (ref 0–0.9)
LDLC SERPL CALC-MCNC: 154 MG/DL
LIPASE SERPL-CCNC: 25 U/L (ref 11–82)
LYMPHOCYTES # BLD AUTO: 2.33 K/UL (ref 1–4.8)
LYMPHOCYTES NFR BLD: 43.7 % (ref 22–41)
MCH RBC QN AUTO: 29 PG (ref 27–33)
MCHC RBC AUTO-ENTMCNC: 32.2 G/DL (ref 33.6–35)
MCV RBC AUTO: 89.9 FL (ref 81.4–97.8)
MONOCYTES # BLD AUTO: 0.32 K/UL (ref 0–0.85)
MONOCYTES NFR BLD AUTO: 6 % (ref 0–13.4)
NEUTROPHILS # BLD AUTO: 2.5 K/UL (ref 2–7.15)
NEUTROPHILS NFR BLD: 46.9 % (ref 44–72)
NRBC # BLD AUTO: 0 K/UL
NRBC BLD-RTO: 0 /100 WBC
PLATELET # BLD AUTO: 184 K/UL (ref 164–446)
PMV BLD AUTO: 13.5 FL (ref 9–12.9)
POTASSIUM SERPL-SCNC: 4.5 MMOL/L (ref 3.6–5.5)
PROT SERPL-MCNC: 8 G/DL (ref 6–8.2)
RBC # BLD AUTO: 4.73 M/UL (ref 4.2–5.4)
SODIUM SERPL-SCNC: 138 MMOL/L (ref 135–145)
T3FREE SERPL-MCNC: 3.26 PG/ML (ref 2–4.4)
T4 FREE SERPL-MCNC: 1.15 NG/DL (ref 0.93–1.7)
TRIGL SERPL-MCNC: 141 MG/DL (ref 0–149)
TSH SERPL DL<=0.005 MIU/L-ACNC: 1.22 UIU/ML (ref 0.38–5.33)
WBC # BLD AUTO: 5.3 K/UL (ref 4.8–10.8)

## 2020-08-15 PROCEDURE — 84439 ASSAY OF FREE THYROXINE: CPT

## 2020-08-15 PROCEDURE — 36415 COLL VENOUS BLD VENIPUNCTURE: CPT

## 2020-08-15 PROCEDURE — 80074 ACUTE HEPATITIS PANEL: CPT

## 2020-08-15 PROCEDURE — 85652 RBC SED RATE AUTOMATED: CPT

## 2020-08-15 PROCEDURE — 84481 FREE ASSAY (FT-3): CPT

## 2020-08-15 PROCEDURE — 83690 ASSAY OF LIPASE: CPT

## 2020-08-15 PROCEDURE — 84443 ASSAY THYROID STIM HORMONE: CPT

## 2020-08-15 PROCEDURE — 85025 COMPLETE CBC W/AUTO DIFF WBC: CPT

## 2020-08-15 PROCEDURE — 83036 HEMOGLOBIN GLYCOSYLATED A1C: CPT

## 2020-08-15 PROCEDURE — 82150 ASSAY OF AMYLASE: CPT

## 2020-08-15 PROCEDURE — 80053 COMPREHEN METABOLIC PANEL: CPT

## 2020-08-15 PROCEDURE — 80061 LIPID PANEL: CPT

## 2020-10-10 ENCOUNTER — HOSPITAL ENCOUNTER (OUTPATIENT)
Dept: LAB | Facility: MEDICAL CENTER | Age: 50
End: 2020-10-10
Attending: PHYSICIAN ASSISTANT
Payer: COMMERCIAL

## 2020-10-10 LAB
ALBUMIN SERPL BCP-MCNC: 4.2 G/DL (ref 3.2–4.9)
ALBUMIN/GLOB SERPL: 1.2 G/DL
ALP SERPL-CCNC: 95 U/L (ref 30–99)
ALT SERPL-CCNC: 20 U/L (ref 2–50)
ANION GAP SERPL CALC-SCNC: 7 MMOL/L (ref 7–16)
AST SERPL-CCNC: 21 U/L (ref 12–45)
BASOPHILS # BLD AUTO: 1.1 % (ref 0–1.8)
BASOPHILS # BLD: 0.06 K/UL (ref 0–0.12)
BILIRUB SERPL-MCNC: 0.5 MG/DL (ref 0.1–1.5)
BUN SERPL-MCNC: 15 MG/DL (ref 8–22)
CALCIUM SERPL-MCNC: 9.4 MG/DL (ref 8.5–10.5)
CHLORIDE SERPL-SCNC: 103 MMOL/L (ref 96–112)
CO2 SERPL-SCNC: 28 MMOL/L (ref 20–33)
CREAT SERPL-MCNC: 0.64 MG/DL (ref 0.5–1.4)
EOSINOPHIL # BLD AUTO: 0.21 K/UL (ref 0–0.51)
EOSINOPHIL NFR BLD: 3.7 % (ref 0–6.9)
ERYTHROCYTE [DISTWIDTH] IN BLOOD BY AUTOMATED COUNT: 47.4 FL (ref 35.9–50)
EST. AVERAGE GLUCOSE BLD GHB EST-MCNC: 128 MG/DL
FASTING STATUS PATIENT QL REPORTED: NORMAL
GLOBULIN SER CALC-MCNC: 3.6 G/DL (ref 1.9–3.5)
GLUCOSE SERPL-MCNC: 89 MG/DL (ref 65–99)
HBA1C MFR BLD: 6.1 % (ref 0–5.6)
HCT VFR BLD AUTO: 44.9 % (ref 37–47)
HGB BLD-MCNC: 13.9 G/DL (ref 12–16)
IMM GRANULOCYTES # BLD AUTO: 0.01 K/UL (ref 0–0.11)
IMM GRANULOCYTES NFR BLD AUTO: 0.2 % (ref 0–0.9)
LYMPHOCYTES # BLD AUTO: 2.32 K/UL (ref 1–4.8)
LYMPHOCYTES NFR BLD: 41.3 % (ref 22–41)
MCH RBC QN AUTO: 28.7 PG (ref 27–33)
MCHC RBC AUTO-ENTMCNC: 31 G/DL (ref 33.6–35)
MCV RBC AUTO: 92.6 FL (ref 81.4–97.8)
MONOCYTES # BLD AUTO: 0.44 K/UL (ref 0–0.85)
MONOCYTES NFR BLD AUTO: 7.8 % (ref 0–13.4)
NEUTROPHILS # BLD AUTO: 2.58 K/UL (ref 2–7.15)
NEUTROPHILS NFR BLD: 45.9 % (ref 44–72)
NRBC # BLD AUTO: 0 K/UL
NRBC BLD-RTO: 0 /100 WBC
PLATELET # BLD AUTO: 170 K/UL (ref 164–446)
PMV BLD AUTO: 13.8 FL (ref 9–12.9)
POTASSIUM SERPL-SCNC: 5.1 MMOL/L (ref 3.6–5.5)
PROT SERPL-MCNC: 7.8 G/DL (ref 6–8.2)
RBC # BLD AUTO: 4.85 M/UL (ref 4.2–5.4)
SODIUM SERPL-SCNC: 138 MMOL/L (ref 135–145)
WBC # BLD AUTO: 5.6 K/UL (ref 4.8–10.8)

## 2020-10-10 PROCEDURE — 83036 HEMOGLOBIN GLYCOSYLATED A1C: CPT

## 2020-10-10 PROCEDURE — 36415 COLL VENOUS BLD VENIPUNCTURE: CPT

## 2020-10-10 PROCEDURE — 80053 COMPREHEN METABOLIC PANEL: CPT

## 2020-10-10 PROCEDURE — 85025 COMPLETE CBC W/AUTO DIFF WBC: CPT

## 2021-01-30 ENCOUNTER — HOSPITAL ENCOUNTER (OUTPATIENT)
Dept: LAB | Facility: MEDICAL CENTER | Age: 51
End: 2021-01-30
Attending: PHYSICIAN ASSISTANT
Payer: COMMERCIAL

## 2021-01-30 LAB
ALBUMIN SERPL BCP-MCNC: 4.2 G/DL (ref 3.2–4.9)
ALBUMIN/GLOB SERPL: 1.1 G/DL
ALP SERPL-CCNC: 84 U/L (ref 30–99)
ALT SERPL-CCNC: 20 U/L (ref 2–50)
ANION GAP SERPL CALC-SCNC: 8 MMOL/L (ref 7–16)
AST SERPL-CCNC: 22 U/L (ref 12–45)
BASOPHILS # BLD AUTO: 0.8 % (ref 0–1.8)
BASOPHILS # BLD: 0.04 K/UL (ref 0–0.12)
BILIRUB SERPL-MCNC: 0.4 MG/DL (ref 0.1–1.5)
BUN SERPL-MCNC: 14 MG/DL (ref 8–22)
CALCIUM SERPL-MCNC: 9.5 MG/DL (ref 8.5–10.5)
CHLORIDE SERPL-SCNC: 104 MMOL/L (ref 96–112)
CHOLEST SERPL-MCNC: 207 MG/DL (ref 100–199)
CO2 SERPL-SCNC: 27 MMOL/L (ref 20–33)
CREAT SERPL-MCNC: 0.57 MG/DL (ref 0.5–1.4)
EOSINOPHIL # BLD AUTO: 0.24 K/UL (ref 0–0.51)
EOSINOPHIL NFR BLD: 4.5 % (ref 0–6.9)
ERYTHROCYTE [DISTWIDTH] IN BLOOD BY AUTOMATED COUNT: 48 FL (ref 35.9–50)
EST. AVERAGE GLUCOSE BLD GHB EST-MCNC: 128 MG/DL
FASTING STATUS PATIENT QL REPORTED: NORMAL
GLOBULIN SER CALC-MCNC: 3.8 G/DL (ref 1.9–3.5)
GLUCOSE SERPL-MCNC: 93 MG/DL (ref 65–99)
HBA1C MFR BLD: 6.1 % (ref 0–5.6)
HCT VFR BLD AUTO: 43.8 % (ref 37–47)
HDLC SERPL-MCNC: 50 MG/DL
HGB BLD-MCNC: 13.6 G/DL (ref 12–16)
IMM GRANULOCYTES # BLD AUTO: 0.01 K/UL (ref 0–0.11)
IMM GRANULOCYTES NFR BLD AUTO: 0.2 % (ref 0–0.9)
LDLC SERPL CALC-MCNC: 131 MG/DL
LYMPHOCYTES # BLD AUTO: 2.77 K/UL (ref 1–4.8)
LYMPHOCYTES NFR BLD: 52.3 % (ref 22–41)
MCH RBC QN AUTO: 28.5 PG (ref 27–33)
MCHC RBC AUTO-ENTMCNC: 31.1 G/DL (ref 33.6–35)
MCV RBC AUTO: 91.8 FL (ref 81.4–97.8)
MONOCYTES # BLD AUTO: 0.36 K/UL (ref 0–0.85)
MONOCYTES NFR BLD AUTO: 6.8 % (ref 0–13.4)
NEUTROPHILS # BLD AUTO: 1.88 K/UL (ref 2–7.15)
NEUTROPHILS NFR BLD: 35.4 % (ref 44–72)
NRBC # BLD AUTO: 0 K/UL
NRBC BLD-RTO: 0 /100 WBC
PLATELET # BLD AUTO: 182 K/UL (ref 164–446)
PMV BLD AUTO: 14 FL (ref 9–12.9)
POTASSIUM SERPL-SCNC: 4.5 MMOL/L (ref 3.6–5.5)
PROT SERPL-MCNC: 8 G/DL (ref 6–8.2)
RBC # BLD AUTO: 4.77 M/UL (ref 4.2–5.4)
SARS-COV-2 AB SERPL QL IA: NORMAL
SODIUM SERPL-SCNC: 139 MMOL/L (ref 135–145)
TRIGL SERPL-MCNC: 131 MG/DL (ref 0–149)
WBC # BLD AUTO: 5.3 K/UL (ref 4.8–10.8)

## 2021-01-30 PROCEDURE — 83036 HEMOGLOBIN GLYCOSYLATED A1C: CPT

## 2021-01-30 PROCEDURE — 80053 COMPREHEN METABOLIC PANEL: CPT

## 2021-01-30 PROCEDURE — 86769 SARS-COV-2 COVID-19 ANTIBODY: CPT

## 2021-01-30 PROCEDURE — 85025 COMPLETE CBC W/AUTO DIFF WBC: CPT

## 2021-01-30 PROCEDURE — 36415 COLL VENOUS BLD VENIPUNCTURE: CPT

## 2021-01-30 PROCEDURE — 80061 LIPID PANEL: CPT

## 2021-06-11 ENCOUNTER — HOSPITAL ENCOUNTER (OUTPATIENT)
Dept: RADIOLOGY | Facility: MEDICAL CENTER | Age: 51
End: 2021-06-11
Attending: FAMILY MEDICINE
Payer: COMMERCIAL

## 2021-06-11 DIAGNOSIS — M54.12 CERVICAL RADICULITIS: ICD-10-CM

## 2021-06-11 PROCEDURE — 72141 MRI NECK SPINE W/O DYE: CPT

## 2022-01-26 ENCOUNTER — HOSPITAL ENCOUNTER (OUTPATIENT)
Dept: LAB | Facility: MEDICAL CENTER | Age: 52
End: 2022-01-26
Attending: STUDENT IN AN ORGANIZED HEALTH CARE EDUCATION/TRAINING PROGRAM
Payer: COMMERCIAL

## 2022-01-26 LAB
25(OH)D3 SERPL-MCNC: 23 NG/ML (ref 30–100)
ALBUMIN SERPL BCP-MCNC: 4.3 G/DL (ref 3.2–4.9)
ALBUMIN/GLOB SERPL: 1.1 G/DL
ALP SERPL-CCNC: 90 U/L (ref 30–99)
ALT SERPL-CCNC: 23 U/L (ref 2–50)
ANION GAP SERPL CALC-SCNC: 12 MMOL/L (ref 7–16)
AST SERPL-CCNC: 21 U/L (ref 12–45)
BASOPHILS # BLD AUTO: 1.1 % (ref 0–1.8)
BASOPHILS # BLD: 0.05 K/UL (ref 0–0.12)
BILIRUB SERPL-MCNC: 0.4 MG/DL (ref 0.1–1.5)
BUN SERPL-MCNC: 11 MG/DL (ref 8–22)
CALCIUM SERPL-MCNC: 9.5 MG/DL (ref 8.5–10.5)
CHLORIDE SERPL-SCNC: 103 MMOL/L (ref 96–112)
CHOLEST SERPL-MCNC: 197 MG/DL (ref 100–199)
CO2 SERPL-SCNC: 26 MMOL/L (ref 20–33)
CREAT SERPL-MCNC: 0.67 MG/DL (ref 0.5–1.4)
EOSINOPHIL # BLD AUTO: 0.15 K/UL (ref 0–0.51)
EOSINOPHIL NFR BLD: 3.3 % (ref 0–6.9)
ERYTHROCYTE [DISTWIDTH] IN BLOOD BY AUTOMATED COUNT: 46.7 FL (ref 35.9–50)
GLOBULIN SER CALC-MCNC: 4 G/DL (ref 1.9–3.5)
GLUCOSE SERPL-MCNC: 111 MG/DL (ref 65–99)
HCT VFR BLD AUTO: 42.7 % (ref 37–47)
HDLC SERPL-MCNC: 48 MG/DL
HGB BLD-MCNC: 13.8 G/DL (ref 12–16)
IMM GRANULOCYTES # BLD AUTO: 0.01 K/UL (ref 0–0.11)
IMM GRANULOCYTES NFR BLD AUTO: 0.2 % (ref 0–0.9)
LDLC SERPL CALC-MCNC: 119 MG/DL
LYMPHOCYTES # BLD AUTO: 2.12 K/UL (ref 1–4.8)
LYMPHOCYTES NFR BLD: 46 % (ref 22–41)
MCH RBC QN AUTO: 29.1 PG (ref 27–33)
MCHC RBC AUTO-ENTMCNC: 32.3 G/DL (ref 33.6–35)
MCV RBC AUTO: 89.9 FL (ref 81.4–97.8)
MONOCYTES # BLD AUTO: 0.28 K/UL (ref 0–0.85)
MONOCYTES NFR BLD AUTO: 6.1 % (ref 0–13.4)
NEUTROPHILS # BLD AUTO: 2 K/UL (ref 2–7.15)
NEUTROPHILS NFR BLD: 43.3 % (ref 44–72)
NRBC # BLD AUTO: 0 K/UL
NRBC BLD-RTO: 0 /100 WBC
PLATELET # BLD AUTO: 196 K/UL (ref 164–446)
PMV BLD AUTO: 13.7 FL (ref 9–12.9)
POTASSIUM SERPL-SCNC: 4.3 MMOL/L (ref 3.6–5.5)
PROT SERPL-MCNC: 8.3 G/DL (ref 6–8.2)
RBC # BLD AUTO: 4.75 M/UL (ref 4.2–5.4)
RHEUMATOID FACT SER IA-ACNC: <10 IU/ML (ref 0–14)
SODIUM SERPL-SCNC: 141 MMOL/L (ref 135–145)
T3FREE SERPL-MCNC: 3.64 PG/ML (ref 2–4.4)
T4 FREE SERPL-MCNC: 1.14 NG/DL (ref 0.93–1.7)
TRIGL SERPL-MCNC: 152 MG/DL (ref 0–149)
TSH SERPL DL<=0.005 MIU/L-ACNC: 1.61 UIU/ML (ref 0.38–5.33)
URATE SERPL-MCNC: 5.3 MG/DL (ref 1.9–8.2)
WBC # BLD AUTO: 4.6 K/UL (ref 4.8–10.8)

## 2022-01-26 PROCEDURE — 86200 CCP ANTIBODY: CPT

## 2022-01-26 PROCEDURE — 86038 ANTINUCLEAR ANTIBODIES: CPT

## 2022-01-26 PROCEDURE — 36415 COLL VENOUS BLD VENIPUNCTURE: CPT

## 2022-01-26 PROCEDURE — 86431 RHEUMATOID FACTOR QUANT: CPT

## 2022-01-26 PROCEDURE — 84439 ASSAY OF FREE THYROXINE: CPT

## 2022-01-26 PROCEDURE — 86225 DNA ANTIBODY NATIVE: CPT

## 2022-01-26 PROCEDURE — 80061 LIPID PANEL: CPT

## 2022-01-26 PROCEDURE — 84443 ASSAY THYROID STIM HORMONE: CPT

## 2022-01-26 PROCEDURE — 80053 COMPREHEN METABOLIC PANEL: CPT

## 2022-01-26 PROCEDURE — 82306 VITAMIN D 25 HYDROXY: CPT

## 2022-01-26 PROCEDURE — 85025 COMPLETE CBC W/AUTO DIFF WBC: CPT

## 2022-01-26 PROCEDURE — 84481 FREE ASSAY (FT-3): CPT

## 2022-01-26 PROCEDURE — 84550 ASSAY OF BLOOD/URIC ACID: CPT

## 2022-01-28 LAB
CCP IGG SERPL-ACNC: 3 UNITS (ref 0–19)
DSDNA AB TITR SER CLIF: 8 IU (ref 0–24)
NUCLEAR IGG SER QL IA: NORMAL

## 2022-04-16 ENCOUNTER — HOSPITAL ENCOUNTER (OUTPATIENT)
Dept: LAB | Facility: MEDICAL CENTER | Age: 52
End: 2022-04-16
Attending: PHYSICIAN ASSISTANT
Payer: COMMERCIAL

## 2022-04-16 LAB
BASOPHILS # BLD AUTO: 0.8 % (ref 0–1.8)
BASOPHILS # BLD: 0.05 K/UL (ref 0–0.12)
EOSINOPHIL # BLD AUTO: 0.22 K/UL (ref 0–0.51)
EOSINOPHIL NFR BLD: 3.7 % (ref 0–6.9)
ERYTHROCYTE [DISTWIDTH] IN BLOOD BY AUTOMATED COUNT: 46.5 FL (ref 35.9–50)
FERRITIN SERPL-MCNC: 80.9 NG/ML (ref 10–291)
H PYLORI AG STL QL IA: NOT DETECTED
HCT VFR BLD AUTO: 43.9 % (ref 37–47)
HGB BLD-MCNC: 13.7 G/DL (ref 12–16)
IMM GRANULOCYTES # BLD AUTO: 0.01 K/UL (ref 0–0.11)
IMM GRANULOCYTES NFR BLD AUTO: 0.2 % (ref 0–0.9)
IRON SATN MFR SERPL: 31 % (ref 15–55)
IRON SERPL-MCNC: 72 UG/DL (ref 40–170)
LIPASE SERPL-CCNC: 30 U/L (ref 11–82)
LYMPHOCYTES # BLD AUTO: 2.54 K/UL (ref 1–4.8)
LYMPHOCYTES NFR BLD: 43 % (ref 22–41)
MCH RBC QN AUTO: 28.4 PG (ref 27–33)
MCHC RBC AUTO-ENTMCNC: 31.2 G/DL (ref 33.6–35)
MCV RBC AUTO: 90.9 FL (ref 81.4–97.8)
MONOCYTES # BLD AUTO: 0.35 K/UL (ref 0–0.85)
MONOCYTES NFR BLD AUTO: 5.9 % (ref 0–13.4)
NEUTROPHILS # BLD AUTO: 2.74 K/UL (ref 2–7.15)
NEUTROPHILS NFR BLD: 46.4 % (ref 44–72)
NRBC # BLD AUTO: 0 K/UL
NRBC BLD-RTO: 0 /100 WBC
PLATELET # BLD AUTO: 182 K/UL (ref 164–446)
PMV BLD AUTO: 14.1 FL (ref 9–12.9)
RBC # BLD AUTO: 4.83 M/UL (ref 4.2–5.4)
TIBC SERPL-MCNC: 236 UG/DL (ref 250–450)
UIBC SERPL-MCNC: 164 UG/DL (ref 110–370)
WBC # BLD AUTO: 5.9 K/UL (ref 4.8–10.8)

## 2022-04-16 PROCEDURE — 83550 IRON BINDING TEST: CPT

## 2022-04-16 PROCEDURE — 36415 COLL VENOUS BLD VENIPUNCTURE: CPT

## 2022-04-16 PROCEDURE — 83690 ASSAY OF LIPASE: CPT

## 2022-04-16 PROCEDURE — 87338 HPYLORI STOOL AG IA: CPT

## 2022-04-16 PROCEDURE — 82728 ASSAY OF FERRITIN: CPT

## 2022-04-16 PROCEDURE — 85025 COMPLETE CBC W/AUTO DIFF WBC: CPT

## 2022-04-16 PROCEDURE — 83540 ASSAY OF IRON: CPT

## 2022-06-17 ENCOUNTER — OFFICE VISIT (OUTPATIENT)
Dept: URGENT CARE | Facility: PHYSICIAN GROUP | Age: 52
End: 2022-06-17
Payer: COMMERCIAL

## 2022-06-17 VITALS
BODY MASS INDEX: 24.49 KG/M2 | RESPIRATION RATE: 16 BRPM | OXYGEN SATURATION: 98 % | WEIGHT: 147 LBS | TEMPERATURE: 97.1 F | DIASTOLIC BLOOD PRESSURE: 78 MMHG | HEART RATE: 73 BPM | HEIGHT: 65 IN | SYSTOLIC BLOOD PRESSURE: 116 MMHG

## 2022-06-17 DIAGNOSIS — U07.1 COVID-19: ICD-10-CM

## 2022-06-17 LAB
EXTERNAL QUALITY CONTROL: ABNORMAL
SARS-COV+SARS-COV-2 AG RESP QL IA.RAPID: POSITIVE

## 2022-06-17 PROCEDURE — 99203 OFFICE O/P NEW LOW 30 MIN: CPT | Mod: CS | Performed by: PHYSICIAN ASSISTANT

## 2022-06-17 PROCEDURE — 87426 SARSCOV CORONAVIRUS AG IA: CPT | Performed by: PHYSICIAN ASSISTANT

## 2022-06-17 ASSESSMENT — FIBROSIS 4 INDEX: FIB4 SCORE: 1.28

## 2022-06-17 ASSESSMENT — ENCOUNTER SYMPTOMS
SHORTNESS OF BREATH: 0
SORE THROAT: 0
CONSTIPATION: 0
ABDOMINAL PAIN: 0
FEVER: 0
NAUSEA: 0
CHILLS: 0
HEADACHES: 0
MYALGIAS: 0
EYE PAIN: 0
DIARRHEA: 0
VOMITING: 0
COUGH: 1

## 2022-06-17 NOTE — LETTER
June 17, 2022    To Whom It May Concern:         This is confirmation that Radha Blank attended her scheduled appointment with Jeremiah Levi P.A.-C. on 6/17/22. Your employee was seen in our clinic today and had a POSITIVE Covid-19 test.    Since the results of testing are positive, your employee should follow the CDC guidelines.  These are isolation for a minimum of 5 days (symptoms began on 6/13/22)  and at least 24 hours have passed since your last fever without the use of fever-reducing medications and all other symptoms have improved.  After completing the isolation the patient must continue to use a well fitting mask for an additional 5 days.  The health department may contact you and provide further directions regarding self-isolation and return to work.     This is the only note that will be provided from [facility] for this visit.  Your employee will require an appointment with a primary care provider if FMLA or disability forms are required.  If you have any questions please do not hesitate to call me at the phone number listed below.    Sincerely,    Jeremiah Levi P.A.-C.  [phone]             If you have any questions please do not hesitate to call me at the phone number listed below.    Sincerely,          Jeremiah Levi P.A.-C.  [phone]

## 2022-06-17 NOTE — PROGRESS NOTES
"Subjective:   Radha Blank is a 52 y.o. female who presents for Coronavirus Screening (Did home test x3days. )      Is a 52-year-old female who had a positive COVID test along with her  around 3 days ago, her workplace is requiring a test in clinic for documentation services as well as a work note regarding appropriate return to work.  She was in contact with her primary care provider who did prescribe antiviral therapy and she noted significant improvement.  Currently she notes that her symptoms are fairly mild and she is hoping to show considerable improvement over the next 72 hours and return to work on Monday      Review of Systems   Constitutional: Positive for malaise/fatigue. Negative for chills and fever.   HENT: Positive for congestion. Negative for ear pain and sore throat.    Eyes: Negative for pain.   Respiratory: Positive for cough. Negative for shortness of breath.    Cardiovascular: Negative for chest pain.   Gastrointestinal: Negative for abdominal pain, constipation, diarrhea, nausea and vomiting.   Genitourinary: Negative for dysuria.   Musculoskeletal: Negative for myalgias.   Skin: Negative for rash.   Neurological: Negative for headaches.       Medications, Allergies, and current problem list reviewed today in Epic.     Objective:     /78   Pulse 73   Temp 36.2 °C (97.1 °F) (Tympanic)   Resp 16   Ht 1.651 m (5' 5\")   Wt 66.7 kg (147 lb)   SpO2 98%     Physical Exam  Vitals reviewed.   Constitutional:       Appearance: Normal appearance. She is not ill-appearing or toxic-appearing.   HENT:      Head: Normocephalic and atraumatic.      Right Ear: External ear normal.      Left Ear: External ear normal.      Nose: Nose normal.      Mouth/Throat:      Mouth: Mucous membranes are moist.   Eyes:      Conjunctiva/sclera: Conjunctivae normal.   Cardiovascular:      Rate and Rhythm: Normal rate and regular rhythm.   Pulmonary:      Effort: Pulmonary effort is normal.      " Breath sounds: Normal breath sounds.   Musculoskeletal:      Right lower leg: No edema.      Left lower leg: No edema.   Skin:     General: Skin is warm and dry.      Capillary Refill: Capillary refill takes less than 2 seconds.   Neurological:      Mental Status: She is alert and oriented to person, place, and time.         Assessment/Plan:     Diagnosis and associated orders:     1. COVID-19  POCT SARS-COV Antigen FANNY (Symptomatic only)      Comments/MDM:     • COVID test positive.  Patient already on antiviral therapy, symptomatically improved, no hypoxia or red flags.  Provided with requested documentation reflecting current CDC guidelines, encouraged to isolate for 5 days from onset of symptoms on 6/13/2022 and not return to work until significantly improved.  Recommend due diligence and caution and recommend masking for an additional 1 to 2 weeks         Differential diagnosis, natural history, supportive care, and indications for immediate follow-up discussed.    Advised the patient to follow-up with the primary care physician for recheck, reevaluation, and consideration of further management.    Please note that this dictation was created using voice recognition software. I have made a reasonable attempt to correct obvious errors, but I expect that there are errors of grammar and possibly content that I did not discover before finalizing the note.    This note was electronically signed by Jeremiah Levi PA-C

## 2022-06-17 NOTE — LETTER
6/17/2022               Radha Howell Abhay  [address] [place] [address]        Dear Radha (MR#[id number]),    This letter is to inform you that your COVID-19 test result is POSITIVE.  This means that the virus that causes COVID-19 was found in your sample.      SARS-COV ANTIGEN FANNY   Date Value Ref Range Status   06/17/2022 Positive (A) Negative, Indeterminate, None Detected, Valid, Invalid, Pass Final     Internal    Date Value Ref Range Status   06/17/2022 Valid  Final     SARS-CoV-2 Ab Qual   Date Value Ref Range Status   01/30/2021 Non-Reactive Non-Reactive Final     Comment:     **The Roche Elecsys Anti-SARS-CoV-2 assay is only for use under the  Food and Drug Administration’s Emergency Use Authorization**  Elecsys Anti-SARS-CoV-2 is an immunoassay intended for qualitative  detection of antibodies to SARS-CoV-2, for use as an aid in identifying  individuals with an adaptive immune response to SARS-CoV-2, indicating  recent or prior infection. This should not be used to diagnose acute  SARS-CoV-2 infection.  The individual immune response following SARS-CoV-2 infection varies  considerably and might give different results with assays from different  manufacturers. Results of assays from different manufacturers should not  be used interchangeably.  Providers Fact Sheet:   https://www.fda.gov/media/460354/download  Patients (Recipients) Fact Sheet:  https://www.fda.gov/media/310722/download         If your symptoms are generally mild and stable, please isolate yourself at home. If it becomes difficult to breathe, contact your healthcare provider as soon as possible.    Next steps:  -  Stay home except to get medical care.  -  Follow the instructions for home isolation below.  -  Call ahead before visiting your healthcare provider or a hospital.  -  Go to the nearest emergency department for worsening symptoms including difficulty breathing, ongoing fever, weakness or chest pain.    You should  isolate yourself:  -  For a minimum of 5 days from symptom onset or positive test and  -  At least 24 hours have passed since your last fever without the use of fever-reducing medications and  -  All other symptoms have improved (loss of taste and smell may persist for weeks or months after recovery and should not delay the end of isolation)    Instructions for Self-Isolation at Home:  -  We recommend you stay in your home and minimize contact with others to avoid spreading this infection.  -  Do not go to work, school or public areas unless otherwise directed by the health department. Avoid using public transportation, ridesharing or taxis.  -  Separate yourself from other people in your home as much as possible.  -  Stay in a specific room and away from other people in your home as much as possible. Use a separate bathroom if possible.        When seeking care at a healthcare facility:  -  Seek prompt medical attention if your illness is worsening (e.g., difficulty breathing).  -  When possible, call the healthcare provider before arriving.  -  Put on a facemask before you enter the facility.  -  If possible, put on a facemask before the ambulance or paramedics arrive.  -  These steps will help the healthcare provider's office prevent other people from getting infected or exposed.    Once any symptoms have resolved, it may be possible to donate plasma to help others that are currently ill with COVID-19. To learn more and apply, please contact the  at [phone] or via e-mail at [email or web address].    For any further questions regarding COVID-19, please contact your [facility]'s health department or healthcare provider.        Sincerely,    [facility] Care Team

## 2022-06-17 NOTE — LETTER
June 17, 2022    To Whom It May Concern:         This is confirmation that Radha Blank attended her scheduled appointment with Jeremiah Levi P.A.-C. on 6/17/22.  Your employee was seen in our clinic today and had a POSITIVE Covid-19 test.    Since the results of testing are positive, your employee should follow the CDC guidelines.  These are isolation for a minimum of 5 days from her symptom onset on 6/13/22) and at least 24 hours have passed since your last fever without the use of fever-reducing medications and all other symptoms have improved.  After completing the isolation the patient must continue to use a well fitting mask for an additional 5 days.  The health department may contact you and provide further directions regarding self-isolation and return to work.     This is the only note that will be provided from [facility] for this visit.  Your employee will require an appointment with a primary care provider if FMLA or disability forms are required.  If you have any questions please do not hesitate to call me at the phone number listed below.    Sincerely,    Jeremiah Levi P.A.-C.  [phone]

## 2022-06-22 ENCOUNTER — HOSPITAL ENCOUNTER (OUTPATIENT)
Dept: RADIOLOGY | Facility: MEDICAL CENTER | Age: 52
End: 2022-06-22
Attending: PHYSICIAN ASSISTANT
Payer: COMMERCIAL

## 2022-06-22 ENCOUNTER — HOSPITAL ENCOUNTER (OUTPATIENT)
Dept: LAB | Facility: MEDICAL CENTER | Age: 52
End: 2022-06-22
Attending: PHYSICIAN ASSISTANT
Payer: COMMERCIAL

## 2022-06-22 DIAGNOSIS — M54.2 CHRONIC NECK PAIN: ICD-10-CM

## 2022-06-22 DIAGNOSIS — M96.1 POSTLAMINECTOMY SYNDROME, CERVICAL REGION: ICD-10-CM

## 2022-06-22 DIAGNOSIS — G89.29 CHRONIC NECK PAIN: ICD-10-CM

## 2022-06-22 LAB
25(OH)D3 SERPL-MCNC: 23 NG/ML (ref 30–100)
ALBUMIN SERPL BCP-MCNC: 4.3 G/DL (ref 3.2–4.9)
ALBUMIN/GLOB SERPL: 1.1 G/DL
ALP SERPL-CCNC: 100 U/L (ref 30–99)
ALT SERPL-CCNC: 20 U/L (ref 2–50)
ANION GAP SERPL CALC-SCNC: 11 MMOL/L (ref 7–16)
AST SERPL-CCNC: 23 U/L (ref 12–45)
BASOPHILS # BLD AUTO: 0.4 % (ref 0–1.8)
BASOPHILS # BLD: 0.02 K/UL (ref 0–0.12)
BILIRUB SERPL-MCNC: 0.4 MG/DL (ref 0.1–1.5)
BUN SERPL-MCNC: 13 MG/DL (ref 8–22)
CALCIUM SERPL-MCNC: 9.1 MG/DL (ref 8.5–10.5)
CHLORIDE SERPL-SCNC: 102 MMOL/L (ref 96–112)
CHOLEST SERPL-MCNC: 209 MG/DL (ref 100–199)
CO2 SERPL-SCNC: 27 MMOL/L (ref 20–33)
CREAT SERPL-MCNC: 0.59 MG/DL (ref 0.5–1.4)
EOSINOPHIL # BLD AUTO: 0.26 K/UL (ref 0–0.51)
EOSINOPHIL NFR BLD: 5.2 % (ref 0–6.9)
ERYTHROCYTE [DISTWIDTH] IN BLOOD BY AUTOMATED COUNT: 44.3 FL (ref 35.9–50)
ERYTHROCYTE [SEDIMENTATION RATE] IN BLOOD BY WESTERGREN METHOD: 12 MM/HOUR (ref 0–25)
FASTING STATUS PATIENT QL REPORTED: NORMAL
FERRITIN SERPL-MCNC: 114 NG/ML (ref 10–291)
FOLATE SERPL-MCNC: 11.2 NG/ML
GFR SERPLBLD CREATININE-BSD FMLA CKD-EPI: 108 ML/MIN/1.73 M 2
GLOBULIN SER CALC-MCNC: 4 G/DL (ref 1.9–3.5)
GLUCOSE SERPL-MCNC: 100 MG/DL (ref 65–99)
HCT VFR BLD AUTO: 44.4 % (ref 37–47)
HDLC SERPL-MCNC: 38 MG/DL
HGB BLD-MCNC: 14.3 G/DL (ref 12–16)
IMM GRANULOCYTES # BLD AUTO: 0.01 K/UL (ref 0–0.11)
IMM GRANULOCYTES NFR BLD AUTO: 0.2 % (ref 0–0.9)
IRON SATN MFR SERPL: 32 % (ref 15–55)
IRON SERPL-MCNC: 71 UG/DL (ref 40–170)
LDLC SERPL CALC-MCNC: 135 MG/DL
LYMPHOCYTES # BLD AUTO: 1.59 K/UL (ref 1–4.8)
LYMPHOCYTES NFR BLD: 31.7 % (ref 22–41)
MCH RBC QN AUTO: 28.4 PG (ref 27–33)
MCHC RBC AUTO-ENTMCNC: 32.2 G/DL (ref 33.6–35)
MCV RBC AUTO: 88.1 FL (ref 81.4–97.8)
MONOCYTES # BLD AUTO: 0.33 K/UL (ref 0–0.85)
MONOCYTES NFR BLD AUTO: 6.6 % (ref 0–13.4)
NEUTROPHILS # BLD AUTO: 2.81 K/UL (ref 2–7.15)
NEUTROPHILS NFR BLD: 55.9 % (ref 44–72)
NRBC # BLD AUTO: 0 K/UL
NRBC BLD-RTO: 0 /100 WBC
PLATELET # BLD AUTO: 190 K/UL (ref 164–446)
PMV BLD AUTO: 13.6 FL (ref 9–12.9)
POTASSIUM SERPL-SCNC: 3.8 MMOL/L (ref 3.6–5.5)
PROT SERPL-MCNC: 8.3 G/DL (ref 6–8.2)
RBC # BLD AUTO: 5.04 M/UL (ref 4.2–5.4)
SODIUM SERPL-SCNC: 140 MMOL/L (ref 135–145)
T3FREE SERPL-MCNC: 2.88 PG/ML (ref 2–4.4)
T4 FREE SERPL-MCNC: 1.31 NG/DL (ref 0.93–1.7)
TIBC SERPL-MCNC: 221 UG/DL (ref 250–450)
TRIGL SERPL-MCNC: 178 MG/DL (ref 0–149)
TSH SERPL DL<=0.005 MIU/L-ACNC: 1.21 UIU/ML (ref 0.38–5.33)
UIBC SERPL-MCNC: 150 UG/DL (ref 110–370)
VIT B12 SERPL-MCNC: 669 PG/ML (ref 211–911)
WBC # BLD AUTO: 5 K/UL (ref 4.8–10.8)

## 2022-06-22 PROCEDURE — 36415 COLL VENOUS BLD VENIPUNCTURE: CPT

## 2022-06-22 PROCEDURE — 84481 FREE ASSAY (FT-3): CPT

## 2022-06-22 PROCEDURE — 84439 ASSAY OF FREE THYROXINE: CPT

## 2022-06-22 PROCEDURE — 84443 ASSAY THYROID STIM HORMONE: CPT

## 2022-06-22 PROCEDURE — 83036 HEMOGLOBIN GLYCOSYLATED A1C: CPT

## 2022-06-22 PROCEDURE — 82306 VITAMIN D 25 HYDROXY: CPT

## 2022-06-22 PROCEDURE — 85025 COMPLETE CBC W/AUTO DIFF WBC: CPT

## 2022-06-22 PROCEDURE — 82746 ASSAY OF FOLIC ACID SERUM: CPT

## 2022-06-22 PROCEDURE — 86800 THYROGLOBULIN ANTIBODY: CPT

## 2022-06-22 PROCEDURE — 80061 LIPID PANEL: CPT

## 2022-06-22 PROCEDURE — 85652 RBC SED RATE AUTOMATED: CPT

## 2022-06-22 PROCEDURE — 82607 VITAMIN B-12: CPT

## 2022-06-22 PROCEDURE — 82728 ASSAY OF FERRITIN: CPT

## 2022-06-22 PROCEDURE — 80053 COMPREHEN METABOLIC PANEL: CPT

## 2022-06-22 PROCEDURE — 83550 IRON BINDING TEST: CPT

## 2022-06-22 PROCEDURE — 83540 ASSAY OF IRON: CPT

## 2022-06-23 LAB
EST. AVERAGE GLUCOSE BLD GHB EST-MCNC: 137 MG/DL
HBA1C MFR BLD: 6.4 % (ref 4–5.6)

## 2022-06-24 ENCOUNTER — HOSPITAL ENCOUNTER (OUTPATIENT)
Dept: RADIOLOGY | Facility: MEDICAL CENTER | Age: 52
End: 2022-06-24
Attending: PHYSICIAN ASSISTANT
Payer: COMMERCIAL

## 2022-06-24 LAB — THYROGLOB AB SERPL-ACNC: <0.9 IU/ML (ref 0–4)

## 2022-06-24 PROCEDURE — 72050 X-RAY EXAM NECK SPINE 4/5VWS: CPT

## 2022-07-02 ENCOUNTER — HOSPITAL ENCOUNTER (OUTPATIENT)
Dept: RADIOLOGY | Facility: MEDICAL CENTER | Age: 52
End: 2022-07-02
Attending: PHYSICIAN ASSISTANT
Payer: COMMERCIAL

## 2022-07-02 PROCEDURE — 72141 MRI NECK SPINE W/O DYE: CPT

## 2022-11-30 ENCOUNTER — OFFICE VISIT (OUTPATIENT)
Dept: URGENT CARE | Facility: PHYSICIAN GROUP | Age: 52
End: 2022-11-30
Payer: COMMERCIAL

## 2022-11-30 ENCOUNTER — HOSPITAL ENCOUNTER (OUTPATIENT)
Facility: MEDICAL CENTER | Age: 52
End: 2022-11-30
Payer: COMMERCIAL

## 2022-11-30 VITALS
HEIGHT: 65 IN | TEMPERATURE: 98.8 F | WEIGHT: 147 LBS | BODY MASS INDEX: 24.49 KG/M2 | OXYGEN SATURATION: 97 % | HEART RATE: 101 BPM | SYSTOLIC BLOOD PRESSURE: 104 MMHG | RESPIRATION RATE: 16 BRPM | DIASTOLIC BLOOD PRESSURE: 70 MMHG

## 2022-11-30 DIAGNOSIS — B34.9 VIRAL ILLNESS: ICD-10-CM

## 2022-11-30 DIAGNOSIS — Z20.822 SUSPECTED COVID-19 VIRUS INFECTION: ICD-10-CM

## 2022-11-30 LAB
FLUAV+FLUBV AG SPEC QL IA: NEGATIVE
INT CON NEG: NORMAL
INT CON POS: NORMAL

## 2022-11-30 PROCEDURE — 87804 INFLUENZA ASSAY W/OPTIC: CPT

## 2022-11-30 PROCEDURE — 0240U HCHG SARS-COV-2 COVID-19 NFCT DS RESP RNA 3 TRGT MIC: CPT

## 2022-11-30 PROCEDURE — 99213 OFFICE O/P EST LOW 20 MIN: CPT | Mod: CS

## 2022-11-30 ASSESSMENT — FIBROSIS 4 INDEX: FIB4 SCORE: 1.43

## 2022-11-30 ASSESSMENT — ENCOUNTER SYMPTOMS
SINUS PAIN: 0
SHORTNESS OF BREATH: 0
FEVER: 1
HEMOPTYSIS: 0
SPUTUM PRODUCTION: 0
SORE THROAT: 1
CHILLS: 1
STRIDOR: 0
COUGH: 0
WHEEZING: 0
MYALGIAS: 1
DIAPHORESIS: 0
WEIGHT LOSS: 0

## 2022-11-30 NOTE — PROGRESS NOTES
Subjective:   Radha Blank is a 52 y.o. female who presents for Body Aches (sore throat, headache, fever, started yesterday )      HPI: This is a 52-year-old female who presents today for flulike symptoms.  This is a new problem.  Patient reports developing fevers, chills, sore throat, and headache yesterday.  She reports fevers with T-max of 100.9.  She has taken Tylenol for her symptoms.  She denies sick contacts.  She reports COVID 19 infection at the beginning of this year.  No cough, wheezing, shortness of breath. She is vaccinated for COVID.       Review of Systems   Constitutional:  Positive for chills, fever and malaise/fatigue. Negative for diaphoresis and weight loss.   HENT:  Positive for sore throat. Negative for congestion, ear pain and sinus pain.    Respiratory:  Negative for cough, hemoptysis, sputum production, shortness of breath, wheezing and stridor.    Musculoskeletal:  Positive for myalgias.     Medications:    Current Outpatient Medications on File Prior to Visit   Medication Sig Dispense Refill    albuterol 108 (90 BASE) MCG/ACT Aero Soln inhalation aerosol Inhale 1 Puff by mouth every 6 hours as needed for Shortness of Breath. 8.5 g 0    loratadine (CLARITIN) 10 MG Tab Take 10 mg by mouth every day.      fluticasone (FLONASE) 50 MCG/ACT nasal spray Spray 2 Sprays in nose every day. 1 Bottle 0    mag hydrox-al hydrox-simeth-diphenhydrAMINE-lidocaine viscous 2% Swish, gargle, and spit, one to two teaspoonfuls every six hours as needed. Shake well before using. (Patient not taking: Reported on 11/30/2022) 120 mL 0     No current facility-administered medications on file prior to visit.        Allergies:   Patient has no known allergies.    Problem List:   Patient Active Problem List   Diagnosis    Chronic neck pain    Chronic low back pain    Chronic right shoulder pain    Fibromyalgia    Displacement of lumbar intervertebral disc without myelopathy    Vitamin D deficiency     "Intractable chronic migraine without aura        Surgical History:  Past Surgical History:   Procedure Laterality Date    LUMBAR LAMINECTOMY DISKECTOMY  10/10/2014    Performed by Gisselle Escobar M.D. at SURGERY [facility] ORS    VAGINAL HYSTERECTOMY SCOPE TOTAL  2009    Performed by UNRULY GARCIA at SURGERY SAME DAY [facility] ORS    CERVICAL DISK AND FUSION ANTERIOR      Dr. Charles due to herniated disc C5-6    OTHER  1993    nasal bone fracture    RHINOPLASTY      ABDOMINAL EXPLORATION      US-NEEDLE CORE BX-BREAST PANEL         Past Social Hx:   Social History     Tobacco Use    Smoking status: Former     Packs/day: 0.25     Years: 0.50     Pack years: 0.13     Types: Cigarettes     Quit date: 2013     Years since quittin.9    Smokeless tobacco: Never    Tobacco comments:     used to smoke occaionally, quit    Substance Use Topics    Alcohol use: Yes     Alcohol/week: 0.0 oz     Comment: occasional    Drug use: No          Problem list, medications, and allergies reviewed by myself today in Epic.     Objective:     /70 (BP Location: Right arm, Patient Position: Sitting, BP Cuff Size: Adult)   Pulse (!) 101   Temp 37.1 °C (98.8 °F) (Temporal)   Resp 16   Ht 1.651 m (5' 5\")   Wt 66.7 kg (147 lb)   SpO2 97%   BMI 24.46 kg/m²     Physical Exam  Vitals and nursing note reviewed.   Constitutional:       General: She is awake. She is not in acute distress.     Appearance: Normal appearance. She is well-developed and normal weight. She is not ill-appearing, toxic-appearing or diaphoretic.   HENT:      Head: Normocephalic and atraumatic.      Right Ear: Tympanic membrane, ear canal and external ear normal. There is no impacted cerumen.      Left Ear: Tympanic membrane, ear canal and external ear normal. There is no impacted cerumen.      Nose: Nose normal. No congestion or rhinorrhea.      Mouth/Throat:      Mouth: Mucous membranes are moist.      Pharynx: Oropharynx is clear. " Posterior oropharyngeal erythema present. No oropharyngeal exudate.   Cardiovascular:      Rate and Rhythm: Regular rhythm. Tachycardia present.      Pulses: Normal pulses.      Heart sounds: Normal heart sounds. No murmur heard.    No friction rub. No gallop.   Pulmonary:      Effort: Pulmonary effort is normal. No respiratory distress.      Breath sounds: Normal breath sounds. No stridor. No wheezing, rhonchi or rales.   Chest:      Chest wall: No tenderness.   Musculoskeletal:      Cervical back: Neck supple. No tenderness.   Lymphadenopathy:      Cervical: No cervical adenopathy.   Skin:     General: Skin is warm and dry.      Capillary Refill: Capillary refill takes less than 2 seconds.   Neurological:      General: No focal deficit present.      Mental Status: She is alert and oriented to person, place, and time. Mental status is at baseline.      Cranial Nerves: No cranial nerve deficit.      Motor: No weakness.      Gait: Gait normal.   Psychiatric:         Mood and Affect: Mood normal.         Behavior: Behavior normal. Behavior is cooperative.         Thought Content: Thought content normal.         Judgment: Judgment normal.       Assessment/Plan:     Diagnosis and associated orders:   1. Viral illness  POCT Influenza A/B    COVID/SARS CoV-2 PCR    Influenza A/B By PCR (Adult - Flu Only)      2. Suspected COVID-19 virus infection            Comments/MDM:   Pt is clinically stable at today's acute urgent care visit.  No acute distress noted. Appropriate for outpatient management at this time.     Acute problem.  Patient's vital signs stable.  POC influenza negative in clinic today.  Upon reevaluation of patient to discuss negative influenza results.  Patient reports that she did have positive home COVID testing, and reports that this type of testing is not sufficient for her employee.  Current  does not have rapid COVID testing.  COVID PCR collected and sent to lab.  Ad[facility] to [facility]  for [facility].  She is to remain home until receiving results.  Discussed current CDC guidelines regarding isolation.  Work note provided. I have discussed with patient that symptoms are viral in etiology. I have recommended supportive care to include; Increased fluids, rest, over-the-counter cold and flu medications, alternating Tylenol and/or ibuprofen per manufactures instructions for symptomatic relief and fevers, and the use of a humidifier. Patient is to return to UC or go to ER for any new or worsening s/s, and follow up with PCP for recheck. Patient is agreeable with plan of care and verbalized good understanding.              Discussed DDx, management options (risks,benefits, and alternatives to planned treatment), natural progression and supportive care.  Expressed understanding and the treatment plan was agreed upon. Questions were encouraged and answered   Return to urgent care prn if new or worsening sx or if there is no improvement in condition prn.    Educated in Red flags and indications to immediately call 911 or present to the Emergency Department.   Advised the patient to follow-up with the primary care physician for recheck, reevaluation, and consideration of further management.    I personally reviewed prior external notes and test results pertinent to today's visit.  I have independently reviewed and interpreted all diagnostics ordered during this urgent care acute visit.       Please note that this dictation was created using voice recognition software. I have made a reasonable attempt to correct obvious errors, but I expect that there are errors of grammar and possibly content that I did not discover before finalizing the note.    This note was electronically signed by ELIZABETH Rodriguez

## 2022-11-30 NOTE — LETTER
November 30, 2022    To Whom It May Concern:         This is confirmation that Radha Blank attended her scheduled appointment with MATTHEW Arroyo on 11/30/22. Your employee was seen in our clinic today.  A concern for COVID-19 has been identified and testing is in progress. We are asking you to excuse absences while following self-isolation protocol per Center for Disease Control (CDC) guidelines.  Your employee will be able to access test results through our electronic delivery system called [employer]     If the results of testing are positive, your employee should follow the CDC guidelines.  These are isolation for a minimum of 5 days and at least 24 hours have passed since your last fever without the use of fever-reducing medications and all other symptoms have improved.  After completing the isolation the patient must continue to use a well fitting mask for an additional 5 days.  The health department may contact you and provide further directions regarding self-isolation and return to work.     If the results of testing are negative, and once there has been no fever (tem >100.4) for at least 48 hours without treatment, and no vomiting or diarrhea for at least 48 hours, then return to work is approved.    This is the only note that will be provided from [facility] for this visit.  Your employee will require an appointment with a primary care provider if FMLA or disability forms are required.  If you have any questions please do not hesitate to call me at the phone number listed below.    Sincerely,    Bambi Parish, APRN  [phone]  (signed electronically)

## 2022-12-01 LAB
FLUAV RNA SPEC QL NAA+PROBE: NEGATIVE
FLUBV RNA SPEC QL NAA+PROBE: NEGATIVE
SARS-COV-2 RNA RESP QL NAA+PROBE: DETECTED
SPECIMEN SOURCE: ABNORMAL

## 2022-12-07 ENCOUNTER — HOSPITAL ENCOUNTER (OUTPATIENT)
Dept: LAB | Facility: MEDICAL CENTER | Age: 52
End: 2022-12-07
Payer: COMMERCIAL

## 2022-12-07 LAB
ALBUMIN SERPL BCP-MCNC: 4.2 G/DL (ref 3.2–4.9)
ALBUMIN/GLOB SERPL: 1 G/DL
ALP SERPL-CCNC: 87 U/L (ref 30–99)
ALT SERPL-CCNC: 16 U/L (ref 2–50)
ANION GAP SERPL CALC-SCNC: 9 MMOL/L (ref 7–16)
AST SERPL-CCNC: 21 U/L (ref 12–45)
BASOPHILS # BLD AUTO: 1 % (ref 0–1.8)
BASOPHILS # BLD: 0.06 K/UL (ref 0–0.12)
BILIRUB SERPL-MCNC: 0.2 MG/DL (ref 0.1–1.5)
BUN SERPL-MCNC: 14 MG/DL (ref 8–22)
CALCIUM SERPL-MCNC: 9.7 MG/DL (ref 8.5–10.5)
CHLORIDE SERPL-SCNC: 104 MMOL/L (ref 96–112)
CO2 SERPL-SCNC: 29 MMOL/L (ref 20–33)
CREAT SERPL-MCNC: 0.7 MG/DL (ref 0.5–1.4)
CRP SERPL HS-MCNC: 1.41 MG/DL (ref 0–0.75)
EOSINOPHIL # BLD AUTO: 0.14 K/UL (ref 0–0.51)
EOSINOPHIL NFR BLD: 2.4 % (ref 0–6.9)
ERYTHROCYTE [DISTWIDTH] IN BLOOD BY AUTOMATED COUNT: 47 FL (ref 35.9–50)
GFR SERPLBLD CREATININE-BSD FMLA CKD-EPI: 103 ML/MIN/1.73 M 2
GLOBULIN SER CALC-MCNC: 4.1 G/DL (ref 1.9–3.5)
GLUCOSE SERPL-MCNC: 120 MG/DL (ref 65–99)
HCT VFR BLD AUTO: 44.3 % (ref 37–47)
HGB BLD-MCNC: 14 G/DL (ref 12–16)
IMM GRANULOCYTES # BLD AUTO: 0.01 K/UL (ref 0–0.11)
IMM GRANULOCYTES NFR BLD AUTO: 0.2 % (ref 0–0.9)
LYMPHOCYTES # BLD AUTO: 2.25 K/UL (ref 1–4.8)
LYMPHOCYTES NFR BLD: 38.5 % (ref 22–41)
MCH RBC QN AUTO: 28.7 PG (ref 27–33)
MCHC RBC AUTO-ENTMCNC: 31.6 G/DL (ref 33.6–35)
MCV RBC AUTO: 91 FL (ref 81.4–97.8)
MONOCYTES # BLD AUTO: 0.35 K/UL (ref 0–0.85)
MONOCYTES NFR BLD AUTO: 6 % (ref 0–13.4)
NEUTROPHILS # BLD AUTO: 3.03 K/UL (ref 2–7.15)
NEUTROPHILS NFR BLD: 51.9 % (ref 44–72)
NRBC # BLD AUTO: 0 K/UL
NRBC BLD-RTO: 0 /100 WBC
PLATELET # BLD AUTO: 209 K/UL (ref 164–446)
PMV BLD AUTO: 13.5 FL (ref 9–12.9)
POTASSIUM SERPL-SCNC: 4.1 MMOL/L (ref 3.6–5.5)
PROT SERPL-MCNC: 8.3 G/DL (ref 6–8.2)
RBC # BLD AUTO: 4.87 M/UL (ref 4.2–5.4)
SODIUM SERPL-SCNC: 142 MMOL/L (ref 135–145)
T3FREE SERPL-MCNC: 2.93 PG/ML (ref 2–4.4)
T4 FREE SERPL-MCNC: 1.28 NG/DL (ref 0.93–1.7)
TSH SERPL DL<=0.005 MIU/L-ACNC: 1.3 UIU/ML (ref 0.38–5.33)
WBC # BLD AUTO: 5.8 K/UL (ref 4.8–10.8)

## 2022-12-07 PROCEDURE — 84443 ASSAY THYROID STIM HORMONE: CPT

## 2022-12-07 PROCEDURE — 36415 COLL VENOUS BLD VENIPUNCTURE: CPT

## 2022-12-07 PROCEDURE — 84439 ASSAY OF FREE THYROXINE: CPT

## 2022-12-07 PROCEDURE — 85025 COMPLETE CBC W/AUTO DIFF WBC: CPT

## 2022-12-07 PROCEDURE — 84481 FREE ASSAY (FT-3): CPT

## 2022-12-07 PROCEDURE — 80053 COMPREHEN METABOLIC PANEL: CPT

## 2022-12-07 PROCEDURE — 85652 RBC SED RATE AUTOMATED: CPT

## 2022-12-07 PROCEDURE — 86140 C-REACTIVE PROTEIN: CPT

## 2022-12-08 LAB — ERYTHROCYTE [SEDIMENTATION RATE] IN BLOOD BY WESTERGREN METHOD: 23 MM/HOUR (ref 0–25)

## 2023-01-21 ENCOUNTER — HOSPITAL ENCOUNTER (OUTPATIENT)
Dept: LAB | Facility: MEDICAL CENTER | Age: 53
End: 2023-01-21
Attending: STUDENT IN AN ORGANIZED HEALTH CARE EDUCATION/TRAINING PROGRAM
Payer: COMMERCIAL

## 2023-01-21 LAB
25(OH)D3 SERPL-MCNC: 33 NG/ML (ref 30–100)
ALBUMIN SERPL BCP-MCNC: 4.2 G/DL (ref 3.2–4.9)
ALBUMIN/GLOB SERPL: 1.1 G/DL
ALP SERPL-CCNC: 90 U/L (ref 30–99)
ALT SERPL-CCNC: 18 U/L (ref 2–50)
ANION GAP SERPL CALC-SCNC: 8 MMOL/L (ref 7–16)
AST SERPL-CCNC: 20 U/L (ref 12–45)
BASOPHILS # BLD AUTO: 0.5 % (ref 0–1.8)
BASOPHILS # BLD: 0.03 K/UL (ref 0–0.12)
BILIRUB SERPL-MCNC: 0.6 MG/DL (ref 0.1–1.5)
BUN SERPL-MCNC: 13 MG/DL (ref 8–22)
CALCIUM ALBUM COR SERPL-MCNC: 9.2 MG/DL (ref 8.5–10.5)
CALCIUM SERPL-MCNC: 9.4 MG/DL (ref 8.5–10.5)
CHLORIDE SERPL-SCNC: 104 MMOL/L (ref 96–112)
CHOLEST SERPL-MCNC: 230 MG/DL (ref 100–199)
CO2 SERPL-SCNC: 30 MMOL/L (ref 20–33)
CREAT SERPL-MCNC: 0.56 MG/DL (ref 0.5–1.4)
EOSINOPHIL # BLD AUTO: 0.2 K/UL (ref 0–0.51)
EOSINOPHIL NFR BLD: 3.5 % (ref 0–6.9)
ERYTHROCYTE [DISTWIDTH] IN BLOOD BY AUTOMATED COUNT: 46.1 FL (ref 35.9–50)
EST. AVERAGE GLUCOSE BLD GHB EST-MCNC: 146 MG/DL
FASTING STATUS PATIENT QL REPORTED: NORMAL
GFR SERPLBLD CREATININE-BSD FMLA CKD-EPI: 109 ML/MIN/1.73 M 2
GLOBULIN SER CALC-MCNC: 3.7 G/DL (ref 1.9–3.5)
GLUCOSE SERPL-MCNC: 111 MG/DL (ref 65–99)
HBA1C MFR BLD: 6.7 % (ref 4–5.6)
HCT VFR BLD AUTO: 43.4 % (ref 37–47)
HDLC SERPL-MCNC: 48 MG/DL
HGB BLD-MCNC: 13.9 G/DL (ref 12–16)
IMM GRANULOCYTES # BLD AUTO: 0.01 K/UL (ref 0–0.11)
IMM GRANULOCYTES NFR BLD AUTO: 0.2 % (ref 0–0.9)
LDLC SERPL CALC-MCNC: 161 MG/DL
LYMPHOCYTES # BLD AUTO: 1.98 K/UL (ref 1–4.8)
LYMPHOCYTES NFR BLD: 34.2 % (ref 22–41)
MCH RBC QN AUTO: 28.7 PG (ref 27–33)
MCHC RBC AUTO-ENTMCNC: 32 G/DL (ref 33.6–35)
MCV RBC AUTO: 89.5 FL (ref 81.4–97.8)
MONOCYTES # BLD AUTO: 0.38 K/UL (ref 0–0.85)
MONOCYTES NFR BLD AUTO: 6.6 % (ref 0–13.4)
NEUTROPHILS # BLD AUTO: 3.19 K/UL (ref 2–7.15)
NEUTROPHILS NFR BLD: 55 % (ref 44–72)
NRBC # BLD AUTO: 0 K/UL
NRBC BLD-RTO: 0 /100 WBC
PLATELET # BLD AUTO: 204 K/UL (ref 164–446)
PMV BLD AUTO: 13.5 FL (ref 9–12.9)
POTASSIUM SERPL-SCNC: 4.3 MMOL/L (ref 3.6–5.5)
PROT SERPL-MCNC: 7.9 G/DL (ref 6–8.2)
RBC # BLD AUTO: 4.85 M/UL (ref 4.2–5.4)
SODIUM SERPL-SCNC: 142 MMOL/L (ref 135–145)
TRIGL SERPL-MCNC: 105 MG/DL (ref 0–149)
WBC # BLD AUTO: 5.8 K/UL (ref 4.8–10.8)

## 2023-01-21 PROCEDURE — 80053 COMPREHEN METABOLIC PANEL: CPT

## 2023-01-21 PROCEDURE — 36415 COLL VENOUS BLD VENIPUNCTURE: CPT

## 2023-01-21 PROCEDURE — 83036 HEMOGLOBIN GLYCOSYLATED A1C: CPT

## 2023-01-21 PROCEDURE — 82306 VITAMIN D 25 HYDROXY: CPT

## 2023-01-21 PROCEDURE — 85025 COMPLETE CBC W/AUTO DIFF WBC: CPT

## 2023-01-21 PROCEDURE — 80061 LIPID PANEL: CPT

## 2023-06-10 ENCOUNTER — HOSPITAL ENCOUNTER (OUTPATIENT)
Dept: LAB | Facility: MEDICAL CENTER | Age: 53
End: 2023-06-10
Payer: COMMERCIAL

## 2023-06-10 LAB
25(OH)D3 SERPL-MCNC: 36 NG/ML (ref 30–100)
ALBUMIN SERPL BCP-MCNC: 4.6 G/DL (ref 3.2–4.9)
ALBUMIN/GLOB SERPL: 1.2 G/DL
ALP SERPL-CCNC: 102 U/L (ref 30–99)
ALT SERPL-CCNC: 31 U/L (ref 2–50)
ANION GAP SERPL CALC-SCNC: 12 MMOL/L (ref 7–16)
AST SERPL-CCNC: 25 U/L (ref 12–45)
BASOPHILS # BLD AUTO: 0.8 % (ref 0–1.8)
BASOPHILS # BLD: 0.04 K/UL (ref 0–0.12)
BILIRUB SERPL-MCNC: 0.5 MG/DL (ref 0.1–1.5)
BUN SERPL-MCNC: 13 MG/DL (ref 8–22)
CALCIUM ALBUM COR SERPL-MCNC: 9.4 MG/DL (ref 8.5–10.5)
CALCIUM SERPL-MCNC: 9.9 MG/DL (ref 8.5–10.5)
CHLORIDE SERPL-SCNC: 107 MMOL/L (ref 96–112)
CHOLEST SERPL-MCNC: 233 MG/DL (ref 100–199)
CO2 SERPL-SCNC: 28 MMOL/L (ref 20–33)
CREAT SERPL-MCNC: 0.69 MG/DL (ref 0.5–1.4)
EOSINOPHIL # BLD AUTO: 0.18 K/UL (ref 0–0.51)
EOSINOPHIL NFR BLD: 3.4 % (ref 0–6.9)
ERYTHROCYTE [DISTWIDTH] IN BLOOD BY AUTOMATED COUNT: 48.2 FL (ref 35.9–50)
EST. AVERAGE GLUCOSE BLD GHB EST-MCNC: 148 MG/DL
GFR SERPLBLD CREATININE-BSD FMLA CKD-EPI: 103 ML/MIN/1.73 M 2
GLOBULIN SER CALC-MCNC: 3.9 G/DL (ref 1.9–3.5)
GLUCOSE SERPL-MCNC: 110 MG/DL (ref 65–99)
HBA1C MFR BLD: 6.8 % (ref 4–5.6)
HCT VFR BLD AUTO: 47.1 % (ref 37–47)
HDLC SERPL-MCNC: 53 MG/DL
HGB BLD-MCNC: 14.5 G/DL (ref 12–16)
IMM GRANULOCYTES # BLD AUTO: 0.01 K/UL (ref 0–0.11)
IMM GRANULOCYTES NFR BLD AUTO: 0.2 % (ref 0–0.9)
LDLC SERPL CALC-MCNC: 156 MG/DL
LYMPHOCYTES # BLD AUTO: 2.29 K/UL (ref 1–4.8)
LYMPHOCYTES NFR BLD: 43.1 % (ref 22–41)
MCH RBC QN AUTO: 28 PG (ref 27–33)
MCHC RBC AUTO-ENTMCNC: 30.8 G/DL (ref 32.2–35.5)
MCV RBC AUTO: 91.1 FL (ref 81.4–97.8)
MONOCYTES # BLD AUTO: 0.36 K/UL (ref 0–0.85)
MONOCYTES NFR BLD AUTO: 6.8 % (ref 0–13.4)
NEUTROPHILS # BLD AUTO: 2.43 K/UL (ref 1.82–7.42)
NEUTROPHILS NFR BLD: 45.7 % (ref 44–72)
NRBC # BLD AUTO: 0 K/UL
NRBC BLD-RTO: 0 /100 WBC (ref 0–0.2)
PLATELET # BLD AUTO: 208 K/UL (ref 164–446)
PMV BLD AUTO: 13.3 FL (ref 9–12.9)
POTASSIUM SERPL-SCNC: 4.7 MMOL/L (ref 3.6–5.5)
PROT SERPL-MCNC: 8.5 G/DL (ref 6–8.2)
RBC # BLD AUTO: 5.17 M/UL (ref 4.2–5.4)
SODIUM SERPL-SCNC: 147 MMOL/L (ref 135–145)
TRIGL SERPL-MCNC: 119 MG/DL (ref 0–149)
WBC # BLD AUTO: 5.3 K/UL (ref 4.8–10.8)

## 2023-06-10 PROCEDURE — 85025 COMPLETE CBC W/AUTO DIFF WBC: CPT

## 2023-06-10 PROCEDURE — 36415 COLL VENOUS BLD VENIPUNCTURE: CPT

## 2023-06-10 PROCEDURE — 82306 VITAMIN D 25 HYDROXY: CPT

## 2023-06-10 PROCEDURE — 80053 COMPREHEN METABOLIC PANEL: CPT

## 2023-06-10 PROCEDURE — 86800 THYROGLOBULIN ANTIBODY: CPT

## 2023-06-10 PROCEDURE — 80061 LIPID PANEL: CPT

## 2023-06-10 PROCEDURE — 83036 HEMOGLOBIN GLYCOSYLATED A1C: CPT

## 2023-06-12 LAB — THYROGLOB AB SERPL-ACNC: <0.9 IU/ML (ref 0–4)

## 2023-10-08 ASSESSMENT — ENCOUNTER SYMPTOMS
WHEEZING: 1
DYSPNEA AT REST: 1
SHORTNESS OF BREATH: 1
CHEST TIGHTNESS: 1
RESPIRATORY SYMPTOMS COMMENTS: YES
HEMOPTYSIS: 0

## 2023-10-11 ENCOUNTER — HOSPITAL ENCOUNTER (OUTPATIENT)
Dept: LAB | Facility: MEDICAL CENTER | Age: 53
End: 2023-10-11
Payer: COMMERCIAL

## 2023-10-11 ENCOUNTER — OFFICE VISIT (OUTPATIENT)
Dept: SLEEP MEDICINE | Facility: MEDICAL CENTER | Age: 53
End: 2023-10-11
Attending: INTERNAL MEDICINE
Payer: COMMERCIAL

## 2023-10-11 VITALS
OXYGEN SATURATION: 96 % | WEIGHT: 148 LBS | SYSTOLIC BLOOD PRESSURE: 122 MMHG | DIASTOLIC BLOOD PRESSURE: 80 MMHG | HEART RATE: 81 BPM | BODY MASS INDEX: 24.66 KG/M2 | HEIGHT: 65 IN

## 2023-10-11 DIAGNOSIS — R06.02 SHORTNESS OF BREATH: ICD-10-CM

## 2023-10-11 LAB
ALBUMIN SERPL BCP-MCNC: 4.4 G/DL (ref 3.2–4.9)
ALBUMIN/GLOB SERPL: 1.1 G/DL
ALP SERPL-CCNC: 101 U/L (ref 30–99)
ALT SERPL-CCNC: 21 U/L (ref 2–50)
ANION GAP SERPL CALC-SCNC: 10 MMOL/L (ref 7–16)
AST SERPL-CCNC: 26 U/L (ref 12–45)
BASOPHILS # BLD AUTO: 0.9 % (ref 0–1.8)
BASOPHILS # BLD: 0.05 K/UL (ref 0–0.12)
BILIRUB SERPL-MCNC: 0.4 MG/DL (ref 0.1–1.5)
BUN SERPL-MCNC: 10 MG/DL (ref 8–22)
CALCIUM ALBUM COR SERPL-MCNC: 9.3 MG/DL (ref 8.5–10.5)
CALCIUM SERPL-MCNC: 9.6 MG/DL (ref 8.5–10.5)
CHLORIDE SERPL-SCNC: 103 MMOL/L (ref 96–112)
CHOLEST SERPL-MCNC: 238 MG/DL (ref 100–199)
CO2 SERPL-SCNC: 29 MMOL/L (ref 20–33)
CREAT SERPL-MCNC: 0.66 MG/DL (ref 0.5–1.4)
EOSINOPHIL # BLD AUTO: 0.23 K/UL (ref 0–0.51)
EOSINOPHIL NFR BLD: 3.9 % (ref 0–6.9)
ERYTHROCYTE [DISTWIDTH] IN BLOOD BY AUTOMATED COUNT: 47.4 FL (ref 35.9–50)
EST. AVERAGE GLUCOSE BLD GHB EST-MCNC: 134 MG/DL
FERRITIN SERPL-MCNC: 79.6 NG/ML (ref 10–291)
FOLATE SERPL-MCNC: 11.9 NG/ML
GFR SERPLBLD CREATININE-BSD FMLA CKD-EPI: 104 ML/MIN/1.73 M 2
GLOBULIN SER CALC-MCNC: 4 G/DL (ref 1.9–3.5)
GLUCOSE SERPL-MCNC: 114 MG/DL (ref 65–99)
HBA1C MFR BLD: 6.3 % (ref 4–5.6)
HCT VFR BLD AUTO: 44.8 % (ref 37–47)
HDLC SERPL-MCNC: 45 MG/DL
HGB BLD-MCNC: 14.2 G/DL (ref 12–16)
IMM GRANULOCYTES # BLD AUTO: 0.04 K/UL (ref 0–0.11)
IMM GRANULOCYTES NFR BLD AUTO: 0.7 % (ref 0–0.9)
IRON SATN MFR SERPL: 32 % (ref 15–55)
IRON SERPL-MCNC: 76 UG/DL (ref 40–170)
LDLC SERPL CALC-MCNC: 158 MG/DL
LYMPHOCYTES # BLD AUTO: 1.81 K/UL (ref 1–4.8)
LYMPHOCYTES NFR BLD: 30.8 % (ref 22–41)
MCH RBC QN AUTO: 28.5 PG (ref 27–33)
MCHC RBC AUTO-ENTMCNC: 31.7 G/DL (ref 32.2–35.5)
MCV RBC AUTO: 90 FL (ref 81.4–97.8)
MONOCYTES # BLD AUTO: 0.38 K/UL (ref 0–0.85)
MONOCYTES NFR BLD AUTO: 6.5 % (ref 0–13.4)
NEUTROPHILS # BLD AUTO: 3.36 K/UL (ref 1.82–7.42)
NEUTROPHILS NFR BLD: 57.2 % (ref 44–72)
NRBC # BLD AUTO: 0 K/UL
NRBC BLD-RTO: 0 /100 WBC (ref 0–0.2)
PLATELET # BLD AUTO: 213 K/UL (ref 164–446)
PMV BLD AUTO: 13.8 FL (ref 9–12.9)
POTASSIUM SERPL-SCNC: 4.8 MMOL/L (ref 3.6–5.5)
PROT SERPL-MCNC: 8.4 G/DL (ref 6–8.2)
RBC # BLD AUTO: 4.98 M/UL (ref 4.2–5.4)
SODIUM SERPL-SCNC: 142 MMOL/L (ref 135–145)
TIBC SERPL-MCNC: 239 UG/DL (ref 250–450)
TRIGL SERPL-MCNC: 175 MG/DL (ref 0–149)
UIBC SERPL-MCNC: 163 UG/DL (ref 110–370)
VIT B12 SERPL-MCNC: 706 PG/ML (ref 211–911)
WBC # BLD AUTO: 5.9 K/UL (ref 4.8–10.8)

## 2023-10-11 PROCEDURE — 83550 IRON BINDING TEST: CPT

## 2023-10-11 PROCEDURE — 80061 LIPID PANEL: CPT

## 2023-10-11 PROCEDURE — 3078F DIAST BP <80 MM HG: CPT | Performed by: INTERNAL MEDICINE

## 2023-10-11 PROCEDURE — 85025 COMPLETE CBC W/AUTO DIFF WBC: CPT

## 2023-10-11 PROCEDURE — 80053 COMPREHEN METABOLIC PANEL: CPT

## 2023-10-11 PROCEDURE — 99211 OFF/OP EST MAY X REQ PHY/QHP: CPT | Performed by: INTERNAL MEDICINE

## 2023-10-11 PROCEDURE — 82746 ASSAY OF FOLIC ACID SERUM: CPT

## 2023-10-11 PROCEDURE — 3074F SYST BP LT 130 MM HG: CPT | Performed by: INTERNAL MEDICINE

## 2023-10-11 PROCEDURE — 36415 COLL VENOUS BLD VENIPUNCTURE: CPT

## 2023-10-11 PROCEDURE — 99204 OFFICE O/P NEW MOD 45 MIN: CPT | Mod: 25 | Performed by: INTERNAL MEDICINE

## 2023-10-11 PROCEDURE — 90471 IMMUNIZATION ADMIN: CPT

## 2023-10-11 PROCEDURE — 3079F DIAST BP 80-89 MM HG: CPT | Performed by: INTERNAL MEDICINE

## 2023-10-11 PROCEDURE — 83540 ASSAY OF IRON: CPT

## 2023-10-11 PROCEDURE — 3077F SYST BP >= 140 MM HG: CPT | Performed by: INTERNAL MEDICINE

## 2023-10-11 PROCEDURE — 82728 ASSAY OF FERRITIN: CPT

## 2023-10-11 PROCEDURE — 82607 VITAMIN B-12: CPT

## 2023-10-11 PROCEDURE — 83036 HEMOGLOBIN GLYCOSYLATED A1C: CPT

## 2023-10-11 RX ORDER — ALBUTEROL SULFATE 90 UG/1
1 AEROSOL, METERED RESPIRATORY (INHALATION) EVERY 6 HOURS PRN
Qty: 8.5 G | Refills: 0 | Status: SHIPPED | OUTPATIENT
Start: 2023-10-11

## 2023-10-11 ASSESSMENT — FIBROSIS 4 INDEX: FIB4 SCORE: 1.17

## 2023-10-11 NOTE — PROGRESS NOTES
Pulmonary Consult    Date of Initial Consult: 10/11/23    Reason for consult: Shortness of breath    Chief Complaint:  Chief Complaint   Patient presents with    Establish Care     Referred by Jazz Bass M.D. for SOB      HPI:   Radha Blank is a very pleasant 53 y.o. female with a history of fibromyalgia. Referred for above. Also with a reported history of +PPD.    Pulmonary History Questionnaire (Submitted on 10/8/2023)  What is the reason for your visit today?: Breading issues  Do you cough first thing in the morning or at other times during the day?: Difent time during the day  Do you have a cough on most days? If so, how long have you had this cough?: No I don’t cough a lot only some times  Bring up phlegm (mucus, sputum) in the morning or other times during the day?: All day  If so, how long have you produced phlegm (Months, Years), and what is the usual color of your phlegm?: Years  Do you cough up blood from your chest?: No  Do you experience wheezing?: Yes  How often?: frequently  Do you experience any chest tightness?: Yes  How often?: intermittent  Experience shortness of breath?: Yes  Experience shortness of breath at rest?: Yes  How far can you walk before becoming short of breath or need to rest? : Don’t  Please list what causes you to become short of breath:: Don’t  Have you ever been hospitalized?: Yes  Reason, year, and hospital in which you were hospitalized:: No  Have you ever needed to be intubated or placed on a ventilator? : No  Have you ever had problems with anesthesia?: No  Have you experienced post-operative delirium?: No  Any complications with surgery?: No  What year did you receive your last Flu shot?: Last year  What year did you receive you last Pneumonia shot?: Never  Have you had a TB skin test? If so, please list the year and result:: Yes in 1995 i was positive  Have you had Allergy skin testing? If so, please list the year and result:: No  a tyshawn Job: Yes  Birds?:  No  Dogs?: No  Cats?: No  Mice and/or Deer Mice?: No  Reptiles?: No  Blood Chemistries: Don’t  Chest X-ray: Yes  Pulmonary Function Test: No  Electrocardiogram: No  Sleep Study: No  Coffee: One  Decaffeinated coffee: No  Energy drinks: No  Tea: No  Carbonated soft drinks: Yes      Patient has not been seen by pulmonary at [facility] in the past.  she has no prior PFTs  History of +quantiferon.      services used: Zeynep ID # [id number]  Patient states that she has had progressive shortness of breath over 4 years.  She has an allergy doctor.  She is allergic to cockroaches, pollen, but not animal dander.  She has never been told she has asthma.  Her  smoked for a number years but quit 2 years ago.  She states that colds tend to linger for her.  Several months ago [place], she was sick with bronchitis.  She had an x-ray.  She was treated with antibiotics and inhalers.  She herself is a never smoker.  She does not vape.  She has lived in [place] for 34 years.  Spends same house for about 20 years.  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    Past Medical History:   Diagnosis Date    Anxiety     Arrhythmia     Arthritis     Fibromyalgia    Depression     Fibromyalgia     Headache(784.0)     Tuberculosis 1990s    With treatment    Ulcer     stomach    Urinary tract infection, site not specified        Past Surgical History:   Procedure Laterality Date    LUMBAR LAMINECTOMY DISKECTOMY  10/10/2014    Performed by Gisselle Escobar M.D. at SURGERY [facility] ORS    VAGINAL HYSTERECTOMY SCOPE TOTAL  8/31/2009    Performed by UNRULY GARCIA at SURGERY SAME DAY [facility] ORS    CERVICAL DISK AND FUSION ANTERIOR  2005    Dr. Charles due to herniated disc C5-6    OTHER  1993    nasal bone fracture    RHINOPLASTY  1991    ABDOMINAL EXPLORATION      US-NEEDLE CORE BX-BREAST PANEL         Social History     Socioeconomic History    Marital status:      Spouse name: Not on file    Number of children: Not on file    Years  of education: Not on file    Highest education level: Not on file   Occupational History    Occupation: [employer]     Employer: OTHER   Tobacco Use    Smoking status: Former     Current packs/day: 0.00     Average packs/day: 0.3 packs/day for 0.5 years (0.1 ttl pk-yrs)     Types: Cigarettes     Start date: 7/9/2012     Quit date: 1/7/2013     Years since quitting: 10.7    Smokeless tobacco: Never    Tobacco comments:     used to smoke occaionally, quit 2009   Substance and Sexual Activity    Alcohol use: Yes     Alcohol/week: 0.0 oz     Comment: occasional    Drug use: No    Sexual activity: Yes     Partners: Male     Birth control/protection: Surgical   Other Topics Concern    Not on file   Social History Narrative    ** Merged History Encounter **          Social Determinants of Health     Financial Resource Strain: Not on file   Food Insecurity: Not on file   Transportation Needs: Not on file   Physical Activity: Not on file   Stress: Not on file   Social Connections: Not on file   Intimate Partner Violence: Not on file   Housing Stability: Not on file          Family History   Problem Relation Age of Onset    Hypertension Mother     Diabetes Mother     Hyperlipidemia Mother     Stroke Mother     Cancer Mother         brain    Heart Disease Mother     Arthritis Father         knee problem    Diabetes Father     Hyperlipidemia Father     Heart Disease Father     Hypertension Father     Diabetes Maternal Grandmother     Heart Disease Maternal Grandmother     Hypertension Maternal Grandmother     Hyperlipidemia Maternal Grandmother     Diabetes Maternal Grandfather     Stroke Maternal Grandfather     Heart Disease Maternal Grandfather     Hypertension Maternal Grandfather     Hyperlipidemia Maternal Grandfather     Diabetes Paternal Grandmother     Stroke Paternal Grandmother     Heart Disease Paternal Grandmother     Hypertension Paternal Grandmother     Hyperlipidemia Paternal Grandmother     Diabetes Paternal  "Grandfather     Heart Disease Paternal Grandfather     Hypertension Paternal Grandfather     Hyperlipidemia Paternal Grandfather     Diabetes Maternal Aunt     Heart Disease Maternal Aunt     Hypertension Maternal Aunt     Hyperlipidemia Maternal Aunt     Stroke Maternal Aunt     Diabetes Maternal Uncle     Heart Disease Maternal Uncle     Hypertension Maternal Uncle     Hyperlipidemia Maternal Uncle     Stroke Maternal Uncle     Diabetes Paternal Aunt     Heart Disease Paternal Aunt     Hypertension Paternal Aunt     Hyperlipidemia Paternal Aunt     Stroke Paternal Aunt     Diabetes Paternal Uncle     Heart Disease Paternal Uncle     Hypertension Paternal Uncle     Hyperlipidemia Paternal Uncle        Current Outpatient Medications on File Prior to Visit   Medication Sig Dispense Refill    mag hydrox-al hydrox-simeth-diphenhydrAMINE-lidocaine viscous 2% Swish, gargle, and spit, one to two teaspoonfuls every six hours as needed. Shake well before using. (Patient not taking: Reported on 11/30/2022) 120 mL 0    albuterol 108 (90 BASE) MCG/ACT Aero Soln inhalation aerosol Inhale 1 Puff by mouth every 6 hours as needed for Shortness of Breath. 8.5 g 0    loratadine (CLARITIN) 10 MG Tab Take 10 mg by mouth every day.      fluticasone (FLONASE) 50 MCG/ACT nasal spray Spray 2 Sprays in nose every day. 1 Bottle 0     No current facility-administered medications on file prior to visit.       Allergies: Patient has no known allergies.      ROS: A 12 point ROS was performed on intake and during my interview. ROS negative unless specifically noted in HPI.     Vitals:  /80 (BP Location: Left arm, Patient Position: Sitting, BP Cuff Size: Adult)   Pulse 81   Ht 1.651 m (5' 5\")   Wt 67.1 kg (148 lb)   SpO2 96%     Wt Readings from Last 3 Encounters:   11/30/22 66.7 kg (147 lb)   06/17/22 66.7 kg (147 lb)   08/18/17 63.5 kg (140 lb)       Physical Exam:  Physical Exam  Vitals reviewed. Exam conducted with a chaperone " present.   Constitutional:       General: She is not in acute distress.     Appearance: Normal appearance. She is not ill-appearing, toxic-appearing or diaphoretic.   Eyes:      General:         Right eye: No discharge.         Left eye: No discharge.      Conjunctiva/sclera: Conjunctivae normal.      Pupils: Pupils are equal, round, and reactive to light.   Cardiovascular:      Rate and Rhythm: Normal rate and regular rhythm.      Pulses: Normal pulses.   Pulmonary:      Effort: Pulmonary effort is normal.      Breath sounds: Normal breath sounds.   Musculoskeletal:      Right lower leg: No edema.      Left lower leg: No edema.   Skin:     General: Skin is warm.      Capillary Refill: Capillary refill takes less than 2 seconds.      Coloration: Skin is not jaundiced.   Neurological:      General: No focal deficit present.      Mental Status: She is alert.   Psychiatric:         Mood and Affect: Mood normal.         Behavior: Behavior normal.         Laboratory Data: I personally reviewed labs including historical lab trends.     Immunization History   Administered Date(s) Administered    INFLUENZA TIV (IM) 09/19/2014    PFIZER COBIAN CAP SARS-COV-2 VACCINATION (12+) 03/19/2022    PFIZER PURPLE CAP SARS-COV-2 VACCINATION (12+) 06/25/2021, 07/16/2021    Tuberculin Skin Test 04/12/2017         PFTs as reviewed by me personally show:    Imaging as reviewed by me personally show:   unremarkable CXR 2020.       Assessment/Plan:    Pulmonary problem list:  #Allergies to pollen and household dust  #History of bronchitis and possible asthma    Plan:  -CXR  -PFTs  -agreeable to influenza vaccine today  -information on asthma written in Cook Islander provided    RTC in approx 3 months to review test results.     Total consult time was 45 minutes. This includes reviewing previous provider notes, personally reviewing previous imaging and spirometry, educating the patient about their disease process, and inhaler education.    __________  Gavin Contreras, DO  Pulmonary and Critical Care Medicine  [facility]    Please note that this dictation was created using voice recognition software. The accuracy of the dictation is limited to the abilities of the software. I have made every reasonable attempt to correct obvious errors, but I expect that there are errors of grammar and possibly content that I did not discover before finalizing the note.

## 2023-10-11 NOTE — PATIENT INSTRUCTIONS
Please schedule your PFT (pulmonary function test). We will review the results in clinic. You will also be able to see the results in MyChart.  Please schedule your chest xray as soon as possible. We will review the results in clinic. You will also be able to see the results in MyChart.  Please refill your Albuterol inhaler.   Please return to clinic in 3 months.

## 2023-10-13 ENCOUNTER — HOSPITAL ENCOUNTER (OUTPATIENT)
Dept: RADIOLOGY | Facility: MEDICAL CENTER | Age: 53
End: 2023-10-13
Attending: INTERNAL MEDICINE
Payer: COMMERCIAL

## 2023-10-13 DIAGNOSIS — R06.02 SHORTNESS OF BREATH: ICD-10-CM

## 2023-10-13 PROCEDURE — 71046 X-RAY EXAM CHEST 2 VIEWS: CPT

## 2023-10-18 ENCOUNTER — HOSPITAL ENCOUNTER (OUTPATIENT)
Dept: RADIOLOGY | Facility: MEDICAL CENTER | Age: 53
End: 2023-10-18
Attending: PHYSICIAN ASSISTANT
Payer: COMMERCIAL

## 2023-10-18 PROCEDURE — 77063 BREAST TOMOSYNTHESIS BI: CPT

## 2023-11-01 ENCOUNTER — APPOINTMENT (OUTPATIENT)
Dept: SLEEP MEDICINE | Facility: MEDICAL CENTER | Age: 53
End: 2023-11-01
Attending: INTERNAL MEDICINE
Payer: COMMERCIAL

## 2023-11-01 VITALS — WEIGHT: 145 LBS | HEIGHT: 64 IN | BODY MASS INDEX: 24.75 KG/M2

## 2023-11-01 DIAGNOSIS — R06.02 SHORTNESS OF BREATH: ICD-10-CM

## 2023-11-01 PROCEDURE — 94729 DIFFUSING CAPACITY: CPT | Performed by: INTERNAL MEDICINE

## 2023-11-01 PROCEDURE — 94726 PLETHYSMOGRAPHY LUNG VOLUMES: CPT | Mod: 26 | Performed by: INTERNAL MEDICINE

## 2023-11-01 PROCEDURE — 94060 EVALUATION OF WHEEZING: CPT | Mod: 26 | Performed by: INTERNAL MEDICINE

## 2023-11-01 PROCEDURE — 94729 DIFFUSING CAPACITY: CPT | Mod: 26 | Performed by: INTERNAL MEDICINE

## 2023-11-01 PROCEDURE — 94726 PLETHYSMOGRAPHY LUNG VOLUMES: CPT | Performed by: INTERNAL MEDICINE

## 2023-11-01 PROCEDURE — 94060 EVALUATION OF WHEEZING: CPT | Performed by: INTERNAL MEDICINE

## 2023-11-01 ASSESSMENT — PULMONARY FUNCTION TESTS
FEV1/FVC_PREDICTED: 80
FEV1_PERCENT_CHANGE: -2
FEV1: 2.55
FEV1/FVC_PERCENT_CHANGE: 0
FEV1_PERCENT_PREDICTED: 97
FEV1_LLN: 2.21
FVC_PERCENT_PREDICTED: 93
FVC: 3.1
FEV1_PREDICTED: 2.65
FEV1/FVC_PERCENT_PREDICTED: 103
FEV1/FVC_PERCENT_PREDICTED: 102
FEV1_PERCENT_PREDICTED: 96
FVC_PERCENT_PREDICTED: 95
FEV1_PERCENT_CHANGE: 0
FEV1/FVC: 82
FVC_PREDICTED: 3.33
FEV1: 2.57
FVC: 3.17
FEV1/FVC_PERCENT_CHANGE: 1
FEV1/FVC_PERCENT_PREDICTED: 80
FEV1/FVC: 81
FEV1/FVC: 82.26
FEV1/FVC_PERCENT_PREDICTED: 102
FEV1/FVC_PERCENT_LLN: 67
FEV1/FVC: 81
FVC_LLN: 2.78
FEV1/FVC_PERCENT_PREDICTED: 101

## 2023-11-01 ASSESSMENT — FIBROSIS 4 INDEX: FIB4 SCORE: 1.44

## 2023-11-01 NOTE — PROCEDURES
Technician: Meme Saez RRT, CPFT  Good patient effort & cooperation. Patients chest felt tight during testing. Some back extrap on PRE/POST FVC. The results of this test meet the ATS/ERS standards for acceptability & reproducibility.  Test was performed on the [employer] Body Plethysmograph-Elite DX system.  Predicted equations for Spirometry are GLI-2012, ITS for lung volumes, and GLI-2017 for DLCO.  The DLCO was uncorrected for Hgb.  A bronchodilator of Ventolin HFA -2puffs via spacer administered.  DLCO performed during dilation period.    Interpretation;    Baseline spirometry shows normal airflows.  No significant bronchodilator response.  Lung volumes are within normal limits.  Diffusion capacity is within normal limits.  Normal pulmonary function testing with normal flow volume loop.  This does not rule out reactive airways disease.  Correlate clinically.

## 2024-09-04 ENCOUNTER — OFFICE VISIT (OUTPATIENT)
Dept: URGENT CARE | Facility: PHYSICIAN GROUP | Age: 54
End: 2024-09-04
Payer: COMMERCIAL

## 2024-09-04 ENCOUNTER — HOSPITAL ENCOUNTER (OUTPATIENT)
Dept: RADIOLOGY | Facility: MEDICAL CENTER | Age: 54
End: 2024-09-04
Attending: PHYSICIAN ASSISTANT
Payer: COMMERCIAL

## 2024-09-04 VITALS
HEIGHT: 65 IN | WEIGHT: 152.3 LBS | HEART RATE: 100 BPM | BODY MASS INDEX: 25.37 KG/M2 | RESPIRATION RATE: 16 BRPM | OXYGEN SATURATION: 96 % | DIASTOLIC BLOOD PRESSURE: 82 MMHG | TEMPERATURE: 96.8 F | SYSTOLIC BLOOD PRESSURE: 126 MMHG

## 2024-09-04 DIAGNOSIS — M25.531 RIGHT WRIST PAIN: ICD-10-CM

## 2024-09-04 PROCEDURE — 99213 OFFICE O/P EST LOW 20 MIN: CPT | Performed by: PHYSICIAN ASSISTANT

## 2024-09-04 PROCEDURE — 3079F DIAST BP 80-89 MM HG: CPT | Performed by: PHYSICIAN ASSISTANT

## 2024-09-04 PROCEDURE — 3074F SYST BP LT 130 MM HG: CPT | Performed by: PHYSICIAN ASSISTANT

## 2024-09-04 RX ORDER — MELOXICAM 15 MG/1
15 TABLET ORAL DAILY
Qty: 30 TABLET | Refills: 0 | Status: SHIPPED | OUTPATIENT
Start: 2024-09-04

## 2024-09-04 ASSESSMENT — ENCOUNTER SYMPTOMS
WEAKNESS: 0
FALLS: 0
TINGLING: 0
MYALGIAS: 1

## 2024-09-04 ASSESSMENT — FIBROSIS 4 INDEX: FIB4 SCORE: 1.47

## 2024-09-05 ENCOUNTER — APPOINTMENT (OUTPATIENT)
Dept: RADIOLOGY | Facility: IMAGING CENTER | Age: 54
End: 2024-09-05
Attending: PHYSICIAN ASSISTANT
Payer: COMMERCIAL

## 2024-09-05 PROCEDURE — 73110 X-RAY EXAM OF WRIST: CPT | Mod: TC,RT | Performed by: RADIOLOGY

## 2024-09-05 NOTE — PROGRESS NOTES
Subjective:     CHIEF COMPLAINT  Chief Complaint   Patient presents with    Arm Pain     R Arm and hand px x 1 wk       HPI  Radha Blank is a very pleasant 54 y.o. female who presents to the clinic with right wrist pain x 1 week.  Denies any preceding injury or trauma.  Pain located primarily over the ulnar aspect of the wrist.  Experiences pain more so with movements and lifting.  Has not noted any swelling or discoloration.  No numbness or tingling distally.  No OTC medications have been started at this time.    REVIEW OF SYSTEMS  Review of Systems   Musculoskeletal:  Positive for joint pain and myalgias. Negative for falls.   Skin:  Negative for rash.   Neurological:  Negative for tingling and weakness.       PAST MEDICAL HISTORY  Patient Active Problem List    Diagnosis Date Noted    Shortness of breath 10/11/2023    Intractable chronic migraine without aura 04/06/2016    Vitamin D deficiency 07/15/2015    Displacement of lumbar intervertebral disc without myelopathy 10/10/2014    Fibromyalgia     Chronic neck pain     Chronic low back pain     Chronic right shoulder pain        SURGICAL HISTORY   has a past surgical history that includes vaginal hysterectomy scope total (8/31/2009); other (1993); abdominal exploration; rhinoplasty (1991); cervical disk and fusion anterior (2005); lumbar laminectomy diskectomy (10/10/2014); and us-needle core bx-breast panel.    ALLERGIES  No Known Allergies    CURRENT MEDICATIONS  Home Medications       Reviewed by Chauncey De Souza P.A.-C. (Physician Assistant) on 09/04/24 at 1858  Med List Status: <None>     Medication Last Dose Status   albuterol 108 (90 Base) MCG/ACT Aero Soln inhalation aerosol Not Taking Active   loratadine (CLARITIN) 10 MG Tab Not Taking Active                    SOCIAL HISTORY  Social History     Tobacco Use    Smoking status: Former     Current packs/day: 0.00     Average packs/day: 0.3 packs/day for 0.5 years (0.1 ttl pk-yrs)     Types:  Cigarettes     Start date: 2012     Quit date: 2013     Years since quittin.6    Smokeless tobacco: Never    Tobacco comments:     used to smoke occaionally, quit 2009   Vaping Use    Vaping status: Former   Substance and Sexual Activity    Alcohol use: Yes     Alcohol/week: 0.0 oz     Comment: occasional    Drug use: No    Sexual activity: Yes     Partners: Male     Birth control/protection: Surgical       FAMILY HISTORY  Family History   Problem Relation Age of Onset    Hypertension Mother     Diabetes Mother     Hyperlipidemia Mother     Stroke Mother     Cancer Mother         brain    Heart Disease Mother     Arthritis Father         knee problem    Diabetes Father     Hyperlipidemia Father     Heart Disease Father     Hypertension Father     Diabetes Maternal Grandmother     Heart Disease Maternal Grandmother     Hypertension Maternal Grandmother     Hyperlipidemia Maternal Grandmother     Diabetes Maternal Grandfather     Stroke Maternal Grandfather     Heart Disease Maternal Grandfather     Hypertension Maternal Grandfather     Hyperlipidemia Maternal Grandfather     Diabetes Paternal Grandmother     Stroke Paternal Grandmother     Heart Disease Paternal Grandmother     Hypertension Paternal Grandmother     Hyperlipidemia Paternal Grandmother     Diabetes Paternal Grandfather     Heart Disease Paternal Grandfather     Hypertension Paternal Grandfather     Hyperlipidemia Paternal Grandfather     Diabetes Maternal Aunt     Heart Disease Maternal Aunt     Hypertension Maternal Aunt     Hyperlipidemia Maternal Aunt     Stroke Maternal Aunt     Diabetes Maternal Uncle     Heart Disease Maternal Uncle     Hypertension Maternal Uncle     Hyperlipidemia Maternal Uncle     Stroke Maternal Uncle     Diabetes Paternal Aunt     Heart Disease Paternal Aunt     Hypertension Paternal Aunt     Hyperlipidemia Paternal Aunt     Stroke Paternal Aunt     Diabetes Paternal Uncle     Heart Disease Paternal Uncle      "Hypertension Paternal Uncle     Hyperlipidemia Paternal Uncle           Objective:     VITAL SIGNS: /82   Pulse 100   Temp 36 °C (96.8 °F) (Temporal)   Resp 16   Ht 1.651 m (5' 5\")   Wt 69.1 kg (152 lb 4.8 oz)   SpO2 96%   BMI 25.34 kg/m²     PHYSICAL EXAM  Physical Exam  Constitutional:       General: She is not in acute distress.     Appearance: Normal appearance. She is not ill-appearing, toxic-appearing or diaphoretic.   HENT:      Head: Normocephalic and atraumatic.   Eyes:      Conjunctiva/sclera: Conjunctivae normal.   Pulmonary:      Effort: Pulmonary effort is normal.   Musculoskeletal:      Cervical back: Normal range of motion.      Comments: Right wrist: No obvious deformity, discoloration or edema present.  Patient has mild tenderness to palpation over the ulnar aspect of the wrist with mild tenderness over the extensor tendons of the forearm.  Maintains full wrist range of motion.   strength 5/5.  Sensation intact distally.   Skin:     Findings: No bruising, erythema or rash.   Neurological:      General: No focal deficit present.      Mental Status: She is alert and oriented to person, place, and time. Mental status is at baseline.         Assessment/Plan:     1. Right wrist pain  - meloxicam (MOBIC) 15 MG tablet; Take 1 Tablet by mouth every day.  Dispense: 30 Tablet; Refill: 0  - DX-WRIST-COMPLETE 3+ RIGHT; Future      MDM/Comments:    Patient experiencing atraumatic right wrist pain x 1 week.  Tenderness present over the ulnar aspect of the wrist in the area of the TFCC and extensor tendons.  At this time findings appear most consistent with tendinitis.  Discussed a trial of ice, NSAIDs and activity modification.  May try wrist brace for comfort.  Placed an x-ray for the patient to have performed if symptoms persist or worsen.    Differential diagnosis, natural history, supportive care, and indications for immediate follow-up discussed. All questions answered. Patient agrees with " the plan of care.    Follow-up as needed if symptoms worsen or fail to improve to PCP, Urgent care or Emergency Room.    I have personally reviewed prior external notes and test results pertinent to today's visit.  I have independently reviewed and interpreted all diagnostics ordered during this urgent care acute visit.   Discussed management options (risks,benefits, and alternatives to treatment). Pt expresses understanding and the treatment plan was agreed upon. Questions were encouraged and answered to pt's satisfaction.    Please note that this dictation was created using voice recognition software. I have made a reasonable attempt to correct obvious errors, but I expect that there are errors of grammar and possibly content that I did not discover before finalizing the note.

## 2024-09-25 PROBLEM — M54.12 CERVICAL RADICULOPATHY: Status: ACTIVE | Noted: 2024-09-25

## 2025-02-10 ENCOUNTER — OFFICE VISIT (OUTPATIENT)
Dept: SLEEP MEDICINE | Facility: MEDICAL CENTER | Age: 55
End: 2025-02-10
Attending: INTERNAL MEDICINE
Payer: COMMERCIAL

## 2025-02-10 VITALS
BODY MASS INDEX: 24.99 KG/M2 | DIASTOLIC BLOOD PRESSURE: 66 MMHG | WEIGHT: 150 LBS | OXYGEN SATURATION: 99 % | SYSTOLIC BLOOD PRESSURE: 106 MMHG | HEIGHT: 65 IN | HEART RATE: 71 BPM

## 2025-02-10 DIAGNOSIS — J20.9 ACUTE BRONCHITIS, UNSPECIFIED ORGANISM: ICD-10-CM

## 2025-02-10 DIAGNOSIS — J45.40 MODERATE PERSISTENT ASTHMA WITHOUT COMPLICATION: ICD-10-CM

## 2025-02-10 DIAGNOSIS — T78.40XA ALLERGY, INITIAL ENCOUNTER: ICD-10-CM

## 2025-02-10 PROCEDURE — 99214 OFFICE O/P EST MOD 30 MIN: CPT | Performed by: INTERNAL MEDICINE

## 2025-02-10 PROCEDURE — 3074F SYST BP LT 130 MM HG: CPT | Performed by: INTERNAL MEDICINE

## 2025-02-10 PROCEDURE — 99212 OFFICE O/P EST SF 10 MIN: CPT | Performed by: INTERNAL MEDICINE

## 2025-02-10 PROCEDURE — 3078F DIAST BP <80 MM HG: CPT | Performed by: INTERNAL MEDICINE

## 2025-02-10 ASSESSMENT — ENCOUNTER SYMPTOMS
EYE PAIN: 0
DEPRESSION: 0
SORE THROAT: 0
COUGH: 1
WHEEZING: 0
DOUBLE VISION: 0
FEVER: 0
PALPITATIONS: 0
DIAPHORESIS: 0
PHOTOPHOBIA: 0
ORTHOPNEA: 0
WEIGHT LOSS: 0
SHORTNESS OF BREATH: 0
FOCAL WEAKNESS: 0
CLAUDICATION: 0
SPEECH CHANGE: 0
VOMITING: 0
HEARTBURN: 0
NAUSEA: 0
BLURRED VISION: 0
SINUS PAIN: 0
BACK PAIN: 0
CHILLS: 0
ABDOMINAL PAIN: 0
SPUTUM PRODUCTION: 0
DIZZINESS: 0
PND: 0
NECK PAIN: 0
HEADACHES: 0
WEAKNESS: 0
EYE DISCHARGE: 0
TREMORS: 0
STRIDOR: 0
DIARRHEA: 0
CONSTIPATION: 0
MYALGIAS: 0
HEMOPTYSIS: 0
EYE REDNESS: 0
FALLS: 0

## 2025-02-10 ASSESSMENT — FIBROSIS 4 INDEX: FIB4 SCORE: 1.47

## 2025-02-10 NOTE — PROGRESS NOTES
Chief Complaint   Patient presents with    Follow-Up     LAST SEEN 10/11/23 BY DR. CONTRERAS    Results     PFT 11/1/23  CXR 10/13/23         HPI: This patient is a 54 y.o. female whom is followed in our clinic for asthma last seen by Dr Contreras in October 2023.  She was referred at that time by allergy to evaluate for asthma.  She had completed 5 years of immunotherapy and per allergy notes was using inhaled corticosteroids on an as-needed basis due to recurrent episodes of bronchitis with upper respiratory infection.  She was also on intranasal steroids, intranasal antihistamine and oral antihistamine.  In addition to this allergy had started her on omeprazole which reportedly improved chronic cough.  The patient is a lifelong non-smoker.  She was seen in our clinic in for initial consultation in October and pulmonary function testing from November showed normal airflows with normal lung volumes and normal DLCO.  Chest x-ray from October was clear.  For unknown reasons no follow-up was scheduled.  Roughly 4 weeks ago the patient developed acute URI and was told she had bronchitis, treated with antibiotics and oral steroids and is currently on steroid inhaler and albuterol although she is not clear which steroid inhaler.  While her acute symptoms have improved, she voices concern today via  about something more serious going on fitting that was the reason she was originally referred to us.  She had a friend who was a non-smoker who developed cancer.  She reports chronic throat clearing with excess mucus ever since johann COVID several years ago.  She is not currently doing any intranasal therapies or omeprazole due to concerns on her bone mineral density.  She did not recall any of the regimens discussed in allergy referral in November 2022.    Past Medical History:   Diagnosis Date    Anxiety     Arrhythmia     Arthritis     Fibromyalgia    Cough     Depression     Fibromyalgia     Headache(784.0)      Shortness of breath     Sputum production     Tuberculosis     With treatment    Ulcer     stomach    Urinary tract infection, site not specified     Wheezing        Social History     Socioeconomic History    Marital status:      Spouse name: Not on file    Number of children: Not on file    Years of education: Not on file    Highest education level: Not on file   Occupational History    Occupation: [employer]     Employer: OTHER   Tobacco Use    Smoking status: Former     Current packs/day: 0.00     Average packs/day: 0.3 packs/day for 0.5 years (0.1 ttl pk-yrs)     Types: Cigarettes     Start date: 2012     Quit date: 2013     Years since quittin.1    Smokeless tobacco: Never    Tobacco comments:     used to smoke occaionally, quit    Vaping Use    Vaping status: Former   Substance and Sexual Activity    Alcohol use: Yes     Alcohol/week: 0.0 oz     Comment: occasional    Drug use: No    Sexual activity: Yes     Partners: Male     Birth control/protection: Surgical   Other Topics Concern    Not on file   Social History Narrative    ** Merged History Encounter **          Social Drivers of Health     Financial Resource Strain: Not on file   Food Insecurity: Not on file   Transportation Needs: Not on file   Physical Activity: Not on file   Stress: Not on file   Social Connections: Not on file   Intimate Partner Violence: Not on file   Housing Stability: Not on file       Family History   Problem Relation Age of Onset    Hypertension Mother     Diabetes Mother     Hyperlipidemia Mother     Stroke Mother     Cancer Mother         brain    Heart Disease Mother     Arthritis Father         knee problem    Diabetes Father     Hyperlipidemia Father     Heart Disease Father     Hypertension Father     Diabetes Maternal Grandmother     Heart Disease Maternal Grandmother     Hypertension Maternal Grandmother     Hyperlipidemia Maternal Grandmother     Diabetes Maternal Grandfather     Stroke  Maternal Grandfather     Heart Disease Maternal Grandfather     Hypertension Maternal Grandfather     Hyperlipidemia Maternal Grandfather     Diabetes Paternal Grandmother     Stroke Paternal Grandmother     Heart Disease Paternal Grandmother     Hypertension Paternal Grandmother     Hyperlipidemia Paternal Grandmother     Diabetes Paternal Grandfather     Heart Disease Paternal Grandfather     Hypertension Paternal Grandfather     Hyperlipidemia Paternal Grandfather     Diabetes Maternal Aunt     Heart Disease Maternal Aunt     Hypertension Maternal Aunt     Hyperlipidemia Maternal Aunt     Stroke Maternal Aunt     Diabetes Maternal Uncle     Heart Disease Maternal Uncle     Hypertension Maternal Uncle     Hyperlipidemia Maternal Uncle     Stroke Maternal Uncle     Diabetes Paternal Aunt     Heart Disease Paternal Aunt     Hypertension Paternal Aunt     Hyperlipidemia Paternal Aunt     Stroke Paternal Aunt     Diabetes Paternal Uncle     Heart Disease Paternal Uncle     Hypertension Paternal Uncle     Hyperlipidemia Paternal Uncle        Current Outpatient Medications on File Prior to Visit   Medication Sig Dispense Refill    meloxicam (MOBIC) 15 MG tablet TAKE ONE TAB PO ONCE A DAY PRN FOR PAIN 30 Tablet 0    meloxicam (MOBIC) 15 MG tablet Take 1 Tablet by mouth every day. 30 Tablet 0    albuterol 108 (90 Base) MCG/ACT Aero Soln inhalation aerosol Inhale 1 Puff every 6 hours as needed for Shortness of Breath. 8.5 g 0    loratadine (CLARITIN) 10 MG Tab Take 10 mg by mouth every day. (Patient not taking: Reported on 2/10/2025)       No current facility-administered medications on file prior to visit.       Patient has no known allergies.      ROS:   Review of Systems   Constitutional:  Negative for chills, diaphoresis, fever, malaise/fatigue and weight loss.   HENT:  Negative for congestion, ear discharge, ear pain, hearing loss, nosebleeds, sinus pain, sore throat and tinnitus.    Eyes:  Negative for blurred  "vision, double vision, photophobia, pain, discharge and redness.   Respiratory:  Positive for cough. Negative for hemoptysis, sputum production, shortness of breath, wheezing and stridor.    Cardiovascular:  Negative for chest pain, palpitations, orthopnea, claudication, leg swelling and PND.   Gastrointestinal:  Negative for abdominal pain, constipation, diarrhea, heartburn, nausea and vomiting.   Genitourinary:  Negative for dysuria and urgency.   Musculoskeletal:  Negative for back pain, falls, joint pain, myalgias and neck pain.   Skin:  Negative for itching and rash.   Neurological:  Negative for dizziness, tremors, speech change, focal weakness, weakness and headaches.   Endo/Heme/Allergies:  Negative for environmental allergies.   Psychiatric/Behavioral:  Negative for depression.        /66 (BP Location: Right arm, Patient Position: Sitting, BP Cuff Size: Adult)   Pulse 71   Ht 1.651 m (5' 5\")   Wt 68 kg (150 lb)   SpO2 99%   Physical Exam  Constitutional:       General: She is not in acute distress.     Appearance: Normal appearance. She is well-developed and normal weight.   HENT:      Head: Normocephalic and atraumatic.      Right Ear: External ear normal.      Left Ear: External ear normal.      Nose: Nose normal. No congestion.      Mouth/Throat:      Mouth: Mucous membranes are moist.      Pharynx: Oropharynx is clear. No oropharyngeal exudate.   Eyes:      General: No scleral icterus.     Extraocular Movements: Extraocular movements intact.      Conjunctiva/sclera: Conjunctivae normal.      Pupils: Pupils are equal, round, and reactive to light.   Neck:      Vascular: No JVD.      Trachea: No tracheal deviation.   Cardiovascular:      Rate and Rhythm: Normal rate and regular rhythm.      Heart sounds: Normal heart sounds. No murmur heard.     No friction rub. No gallop.   Pulmonary:      Effort: Pulmonary effort is normal. No accessory muscle usage or respiratory distress.      Breath sounds: " Normal breath sounds. No wheezing or rales.   Abdominal:      General: There is no distension.      Palpations: Abdomen is soft.      Tenderness: There is no abdominal tenderness.   Musculoskeletal:         General: No tenderness or deformity. Normal range of motion.      Cervical back: Normal range of motion and neck supple.      Right lower leg: No edema.      Left lower leg: No edema.   Lymphadenopathy:      Cervical: No cervical adenopathy.   Skin:     General: Skin is warm and dry.      Findings: No rash.      Nails: There is no clubbing.   Neurological:      Mental Status: She is alert and oriented to person, place, and time.      Cranial Nerves: No cranial nerve deficit.      Gait: Gait normal.   Psychiatric:         Behavior: Behavior normal.         PFTs as reviewed by me personally: As per HPI    Imagaing as reviewed by me personally: As per HPI    Assessment:  1. Moderate persistent asthma without complication  DX-CHEST-2 VIEWS    Spirometry      2. Allergy, initial encounter        3. Acute bronchitis, unspecified organism  DX-CHEST-2 VIEWS          Plan:  Chronic diagnosis and new to me.  She does not appear to be in acute exacerbation today but was frequently clearing her throat throughout our visit suggesting me to believe she may have untreated reflux.  She denies active symptoms of this and we did not empirically put her on therapy due to concerns for side effects.  She will contact me with her steroid inhaler regimen and we will see if stepping therapy up helps with mucus production and frequent throat clearing which I suspect is more likely due to postnasal drip or reflux.  I did advise her to resume intranasal steroids twice daily and given her concerns regarding something more serious, I tried to reassure her and will order follow-up chest x-ray as well as spirometry.  Has completed 5 years of immunotherapy.  No longer seeing allergy.  Per patient clinically improving although sounds like her  concern is more chronic symptoms which has not been alleviated with traditional therapies in the past.  I discussed that we will need to see her for more frequent follow-up to determine the source of her symptoms while we empirically addressed some of the most common ones such as postnasal drip as per above.  Return in about 4 weeks (around 3/10/2025).

## 2025-02-10 NOTE — PATIENT INSTRUCTIONS
RESUME FLONASE ONE SPRAY EACH NOSTRIL AT NIGHT BEFORE BED AND IN AM POR DOS SEMANAS   Puede usar mucinex (guafenesin 400-600mg 1-2x/day)

## 2025-06-26 NOTE — PROGRESS NOTES
Pulmonary Clinic Note    Date of Visit: 7/2/2025     Chief Complaint:  Chief Complaint   Patient presents with    Follow-Up     Last seen 2/10/25 w/ Dr. Mcclain for Moderate persistent asthma without complication     [place] 7/1/25     HPI:   Radha Blank is a very pleasant 55 y.o. year old female former smoker (<5 pack-years, quit in 2013), with a PMHx of asthma, allergies, GERD, fibromyalgia, who presented to the Pulmonary Clinic for a regular follow up. Last seen in the office on 2/10/2025 with Dr. Mcclain.     Patient is followed by the pulmonary office for asthma.  PFTs in 2023 show normal airflow with normal lung volumes and diffusion capacity with a FEV1 2.57 L or 97%, FEV1/FVC 81%, %, DLCO 135% predicted, with a positive bronchodilator response.  CXR in 2023 was unremarkable.  CT chest was also unremarkable.  Absolute eosinophil count of 0.05.  Patient is currently on albuterol as needed.  Patient does have a history of allergies and has completed 5 years of immunotherapy.  Patient had been started on inhaled corticosteroids and had recurrent episodes of bronchitis with upper respiratory infection.  Patient has also been on intranasal steroids and antihistamines as well as oral antihistamines in the past.  Patient also has history of GERD and has been on omeprazole, which had improved her cough.  Her original presentation she had complaints of throat clearing.    Interval events:  7/2/2025-  Patient states that she is short of breath with mMRC of 2 and will have a cough with sputum production.  She does state that she has postnasal drip, but feels that her sputum is not coming from the postnasal drip.  She has not been using any saline rinses or nasal sprays.  She is requesting a oral suction device.  She will use her albuterol couple times a week.  Patient also reports that she is on a secondary inhaler, but I do not see any record of it and she does not know the name.      Past Medical  History[1]  Past Surgical History[2]  Social History     Socioeconomic History    Marital status:      Spouse name: Not on file    Number of children: Not on file    Years of education: Not on file    Highest education level: Not on file   Occupational History    Occupation: [employer]     Employer: OTHER   Tobacco Use    Smoking status: Former     Current packs/day: 0.00     Average packs/day: 0.3 packs/day for 0.5 years (0.1 ttl pk-yrs)     Types: Cigarettes     Start date: 2012     Quit date: 2013     Years since quittin.4    Smokeless tobacco: Never    Tobacco comments:     used to smoke occaionally, quit    Vaping Use    Vaping status: Former   Substance and Sexual Activity    Alcohol use: Yes     Alcohol/week: 0.0 oz     Comment: occasional    Drug use: No    Sexual activity: Yes     Partners: Male     Birth control/protection: Surgical   Other Topics Concern    Not on file   Social History Narrative    ** Merged History Encounter **          Social Drivers of Health     Financial Resource Strain: Not on file   Food Insecurity: Not on file   Transportation Needs: Not on file   Physical Activity: Not on file   Stress: Not on file   Social Connections: Not on file   Intimate Partner Violence: Not on file   Housing Stability: Not on file        Family History   Problem Relation Age of Onset    Hypertension Mother     Diabetes Mother     Hyperlipidemia Mother     Stroke Mother     Cancer Mother         brain    Heart Disease Mother     Arthritis Father         knee problem    Diabetes Father     Hyperlipidemia Father     Heart Disease Father     Hypertension Father     Diabetes Maternal Grandmother     Heart Disease Maternal Grandmother     Hypertension Maternal Grandmother     Hyperlipidemia Maternal Grandmother     Diabetes Maternal Grandfather     Stroke Maternal Grandfather     Heart Disease Maternal Grandfather     Hypertension Maternal Grandfather     Hyperlipidemia Maternal  "Grandfather     Diabetes Paternal Grandmother     Stroke Paternal Grandmother     Heart Disease Paternal Grandmother     Hypertension Paternal Grandmother     Hyperlipidemia Paternal Grandmother     Diabetes Paternal Grandfather     Heart Disease Paternal Grandfather     Hypertension Paternal Grandfather     Hyperlipidemia Paternal Grandfather     Diabetes Maternal Aunt     Heart Disease Maternal Aunt     Hypertension Maternal Aunt     Hyperlipidemia Maternal Aunt     Stroke Maternal Aunt     Diabetes Maternal Uncle     Heart Disease Maternal Uncle     Hypertension Maternal Uncle     Hyperlipidemia Maternal Uncle     Stroke Maternal Uncle     Diabetes Paternal Aunt     Heart Disease Paternal Aunt     Hypertension Paternal Aunt     Hyperlipidemia Paternal Aunt     Stroke Paternal Aunt     Diabetes Paternal Uncle     Heart Disease Paternal Uncle     Hypertension Paternal Uncle     Hyperlipidemia Paternal Uncle      Medications Ordered Prior to Encounter[3]  Allergies: Patient has no known allergies.    ROS:   Review of Systems   Constitutional:  Negative for chills, diaphoresis, fever and malaise/fatigue.   HENT:  Positive for congestion. Negative for sinus pain.    Respiratory:  Positive for cough, sputum production and shortness of breath. Negative for hemoptysis and wheezing.    Cardiovascular:  Negative for chest pain, palpitations and leg swelling.   Gastrointestinal:  Negative for diarrhea, heartburn, nausea and vomiting.   Musculoskeletal:  Negative for falls and myalgias.   Neurological:  Negative for dizziness, weakness and headaches.     Vitals:  /66 (BP Location: Left arm, Patient Position: Sitting, BP Cuff Size: Adult)   Pulse 98   Ht 1.651 m (5' 5\")   Wt 70.8 kg (156 lb)   SpO2 95%     Physical Exam  Constitutional:       General: She is not in acute distress.     Appearance: Normal appearance. She is not ill-appearing, toxic-appearing or diaphoretic.   Cardiovascular:      Rate and Rhythm: " Normal rate and regular rhythm.      Pulses: Normal pulses.      Heart sounds: Normal heart sounds. No murmur heard.     No friction rub. No gallop.   Pulmonary:      Effort: Pulmonary effort is normal. No respiratory distress.      Breath sounds: Normal breath sounds. No stridor. No wheezing, rhonchi or rales.   Musculoskeletal:         General: No swelling.      Right lower leg: No edema.      Left lower leg: No edema.   Skin:     General: Skin is warm.   Neurological:      General: No focal deficit present.      Mental Status: She is alert and oriented to person, place, and time.   Psychiatric:         Mood and Affect: Mood normal.         Behavior: Behavior normal.         Thought Content: Thought content normal.         Judgment: Judgment normal.         Laboratory Data:  PFTs: (Date: 11/1/2023)-      Impression:  Baseline spirometry shows normal airflows.  No significant bronchodilator response.  Lung volumes are within normal limits.  Diffusion capacity is within normal limits.  Normal pulmonary function testing with normal flow volume loop.  This does not rule out reactive airways disease.  Correlate clinically.      Assessment and Plan:    Problem List Items Addressed This Visit       Moderate persistent asthma without complication - Primary    PFTs in 2023 show normal airflow with normal lung volumes and diffusion capacity.  CXR in 2023 was unremarkable.  CT chest was also unremarkable.  Absolute eosinophil count of 0.05.  Patient is currently on albuterol as needed.  No exacerbations.  --Advised patient to schedule spirometry before next appointment.  --We will also get CXR report from [facility] and review at her next appointment.  --Advised that patient continue all inhaler therapies.  I also advised patient to bring the physical inhalers to her next appointment so we can do a proper medication review.  --Advised patient increase exercise as tolerated  --Advised patient remain up-to-date on all vaccines  --I  did place an order for a oral suction device.  I did explain to her that insurance may not cover it.         Relevant Orders    DME Other    BMI 25.0-25.9,adult    Postnasal drip    I do think that her postnasal drip is causing the cough and sputum production.  --Saline sinus rinse followed by Flonase         Allergy    Patient does have a history of allergies and has completed 5 years of immunotherapy.           Gastroesophageal reflux disease without esophagitis    History of GERD and has been on omeprazole in the past.  No current complaints.          Diagnostic studies have been reviewed with the patient.    Return in about 4 weeks (around 7/30/2025), or if symptoms worsen or fail to improve, for ASTHMA, PFT, with Greg.     This note was generated using voice recognition software which has a chance of producing errors of grammar and possibly content.  I have made every reasonable attempt to find and correct any obvious errors, but it should be expected that some may not be found prior to finalization of this note.    Time spent in record review prior to patient arrival, reviewing results, and in face-to-face encounter totaled 28 min.  __________  ELIZABETH Bowen  Pulmonary Medicine  [facility]          [1]   Past Medical History:  Diagnosis Date    Allergy 7/2/2025    Anxiety     Arrhythmia     Arthritis     Fibromyalgia    Cough     Depression     Fibromyalgia     Gastroesophageal reflux disease without esophagitis 7/2/2025    Headache(784.0)     Shortness of breath     Sputum production     Tuberculosis 1990s    With treatment    Ulcer     stomach    Urinary tract infection, site not specified     Wheezing    [2]   Past Surgical History:  Procedure Laterality Date    LUMBAR LAMINECTOMY DISKECTOMY  10/10/2014    Performed by Gisselle Escobar M.D. at SURGERY [facility]    VAGINAL HYSTERECTOMY SCOPE TOTAL  8/31/2009    Performed by UNRULY GARCIA at SURGERY SAME DAY [facility] ORS    CERVICAL  DISK AND FUSION ANTERIOR  2005    Dr. Charles due to herniated disc C5-6    OTHER  1993    nasal bone fracture    RHINOPLASTY  1991    ABDOMINAL EXPLORATION      [facility]-NEEDLE CORE BX-BREAST PANEL     [3]   Current Outpatient Medications on File Prior to Visit   Medication Sig Dispense Refill    meloxicam (MOBIC) 15 MG tablet TAKE ONE TAB PO ONCE A DAY PRN FOR PAIN 30 Tablet 0    meloxicam (MOBIC) 15 MG tablet Take 1 Tablet by mouth every day. 30 Tablet 0    albuterol 108 (90 Base) MCG/ACT Aero Soln inhalation aerosol Inhale 1 Puff every 6 hours as needed for Shortness of Breath. 8.5 g 0    loratadine (CLARITIN) 10 MG Tab Take 10 mg by mouth every day.       No current facility-administered medications on file prior to visit.

## 2025-07-02 ENCOUNTER — OFFICE VISIT (OUTPATIENT)
Dept: SLEEP MEDICINE | Facility: MEDICAL CENTER | Age: 55
End: 2025-07-02
Payer: COMMERCIAL

## 2025-07-02 VITALS
HEART RATE: 98 BPM | BODY MASS INDEX: 25.99 KG/M2 | HEIGHT: 65 IN | OXYGEN SATURATION: 95 % | SYSTOLIC BLOOD PRESSURE: 108 MMHG | WEIGHT: 156 LBS | DIASTOLIC BLOOD PRESSURE: 66 MMHG

## 2025-07-02 DIAGNOSIS — R09.82 POSTNASAL DRIP: ICD-10-CM

## 2025-07-02 DIAGNOSIS — J45.40 MODERATE PERSISTENT ASTHMA WITHOUT COMPLICATION: Primary | ICD-10-CM

## 2025-07-02 DIAGNOSIS — K21.9 GASTROESOPHAGEAL REFLUX DISEASE WITHOUT ESOPHAGITIS: ICD-10-CM

## 2025-07-02 DIAGNOSIS — T78.40XS ALLERGY, SEQUELA: ICD-10-CM

## 2025-07-02 PROBLEM — T78.40XA ALLERGY: Status: ACTIVE | Noted: 2025-07-02

## 2025-07-02 PROCEDURE — 99213 OFFICE O/P EST LOW 20 MIN: CPT

## 2025-07-02 PROCEDURE — 3074F SYST BP LT 130 MM HG: CPT

## 2025-07-02 PROCEDURE — 3078F DIAST BP <80 MM HG: CPT

## 2025-07-02 ASSESSMENT — ENCOUNTER SYMPTOMS
HEARTBURN: 0
MYALGIAS: 0
FEVER: 0
PALPITATIONS: 0
HEADACHES: 0
VOMITING: 0
SINUS PAIN: 0
DIARRHEA: 0
WHEEZING: 0
FALLS: 0
SPUTUM PRODUCTION: 1
DIZZINESS: 0
DIAPHORESIS: 0
WEAKNESS: 0
NAUSEA: 0
COUGH: 1
HEMOPTYSIS: 0
SHORTNESS OF BREATH: 1
CHILLS: 0

## 2025-07-02 ASSESSMENT — FIBROSIS 4 INDEX: FIB4 SCORE: 1.49

## 2025-07-03 NOTE — ASSESSMENT & PLAN NOTE
PFTs in 2023 show normal airflow with normal lung volumes and diffusion capacity.  CXR in 2023 was unremarkable.  CT chest was also unremarkable.  Absolute eosinophil count of 0.05.  Patient is currently on albuterol as needed.  No exacerbations.  --Advised patient to schedule spirometry before next appointment.  --We will also get CXR report from [facility] and review at her next appointment.  --Advised that patient continue all inhaler therapies.  I also advised patient to bring the physical inhalers to her next appointment so we can do a proper medication review.  --Advised patient increase exercise as tolerated  --Advised patient remain up-to-date on all vaccines  --I did place an order for a oral suction device.  I did explain to her that insurance may not cover it.

## 2025-07-03 NOTE — ASSESSMENT & PLAN NOTE
I do think that her postnasal drip is causing the cough and sputum production.  --Saline sinus rinse followed by Flonase

## 2025-08-07 ASSESSMENT — ENCOUNTER SYMPTOMS
DIZZINESS: 0
COUGH: 1
VOMITING: 0
WEAKNESS: 0
HEMOPTYSIS: 0
FALLS: 0
SPUTUM PRODUCTION: 1
NAUSEA: 0
DIARRHEA: 0
FEVER: 0
WHEEZING: 0
HEARTBURN: 0
HEADACHES: 0
DIAPHORESIS: 0
SINUS PAIN: 0
PALPITATIONS: 0
SHORTNESS OF BREATH: 1
CHILLS: 0
MYALGIAS: 0

## 2025-08-08 ENCOUNTER — NON-PROVIDER VISIT (OUTPATIENT)
Dept: SLEEP MEDICINE | Facility: MEDICAL CENTER | Age: 55
End: 2025-08-08
Attending: INTERNAL MEDICINE
Payer: COMMERCIAL

## 2025-08-08 ENCOUNTER — OFFICE VISIT (OUTPATIENT)
Dept: SLEEP MEDICINE | Facility: MEDICAL CENTER | Age: 55
End: 2025-08-08
Payer: COMMERCIAL

## 2025-08-08 VITALS — BODY MASS INDEX: 26.29 KG/M2 | WEIGHT: 154 LBS | HEIGHT: 64 IN

## 2025-08-08 VITALS
HEART RATE: 74 BPM | BODY MASS INDEX: 26.29 KG/M2 | SYSTOLIC BLOOD PRESSURE: 118 MMHG | WEIGHT: 154 LBS | HEIGHT: 64 IN | DIASTOLIC BLOOD PRESSURE: 66 MMHG | OXYGEN SATURATION: 95 %

## 2025-08-08 DIAGNOSIS — J45.40 MODERATE PERSISTENT ASTHMA WITHOUT COMPLICATION: Primary | Chronic | ICD-10-CM

## 2025-08-08 DIAGNOSIS — J45.40 MODERATE PERSISTENT ASTHMA WITHOUT COMPLICATION: ICD-10-CM

## 2025-08-08 DIAGNOSIS — A15.9 TB (TUBERCULOSIS), TREATED: ICD-10-CM

## 2025-08-08 DIAGNOSIS — R09.82 POSTNASAL DRIP: ICD-10-CM

## 2025-08-08 DIAGNOSIS — R91.8 PULMONARY NODULES: ICD-10-CM

## 2025-08-08 DIAGNOSIS — Z23 NEED FOR VACCINATION: ICD-10-CM

## 2025-08-08 PROCEDURE — 99214 OFFICE O/P EST MOD 30 MIN: CPT

## 2025-08-08 PROCEDURE — 3078F DIAST BP <80 MM HG: CPT

## 2025-08-08 PROCEDURE — 3074F SYST BP LT 130 MM HG: CPT

## 2025-08-08 PROCEDURE — 90471 IMMUNIZATION ADMIN: CPT

## 2025-08-08 PROCEDURE — 94060 EVALUATION OF WHEEZING: CPT | Performed by: INTERNAL MEDICINE

## 2025-08-08 PROCEDURE — 94060 EVALUATION OF WHEEZING: CPT | Mod: 26 | Performed by: STUDENT IN AN ORGANIZED HEALTH CARE EDUCATION/TRAINING PROGRAM

## 2025-08-08 PROCEDURE — 99213 OFFICE O/P EST LOW 20 MIN: CPT

## 2025-08-08 RX ORDER — ALBUTEROL SULFATE 90 UG/1
1 INHALANT RESPIRATORY (INHALATION) EVERY 6 HOURS PRN
Qty: 8 G | Refills: 11 | Status: SHIPPED | OUTPATIENT
Start: 2025-08-08

## 2025-08-08 RX ORDER — BUDESONIDE AND FORMOTEROL FUMARATE DIHYDRATE 160; 4.5 UG/1; UG/1
2 AEROSOL RESPIRATORY (INHALATION) 2 TIMES DAILY
Qty: 30.6 G | Refills: 3 | Status: SHIPPED | OUTPATIENT
Start: 2025-08-08

## 2025-08-08 ASSESSMENT — FIBROSIS 4 INDEX
FIB4 SCORE: 1.49
FIB4 SCORE: 1.49

## 2025-08-30 ENCOUNTER — HOSPITAL ENCOUNTER (OUTPATIENT)
Dept: LAB | Facility: MEDICAL CENTER | Age: 55
End: 2025-08-30
Attending: NURSE PRACTITIONER
Payer: COMMERCIAL

## 2025-08-30 LAB
ALBUMIN SERPL BCP-MCNC: 4.1 G/DL (ref 3.2–4.9)
ALBUMIN/GLOB SERPL: 1 G/DL
ALP SERPL-CCNC: 102 U/L (ref 30–99)
ALT SERPL-CCNC: 29 U/L (ref 2–50)
ANION GAP SERPL CALC-SCNC: 9 MMOL/L (ref 7–16)
AST SERPL-CCNC: 25 U/L (ref 12–45)
BASOPHILS # BLD AUTO: 0.5 % (ref 0–1.8)
BASOPHILS # BLD: 0.03 K/UL (ref 0–0.12)
BILIRUB SERPL-MCNC: 0.5 MG/DL (ref 0.1–1.5)
BUN SERPL-MCNC: 18 MG/DL (ref 8–22)
CALCIUM ALBUM COR SERPL-MCNC: 9.3 MG/DL (ref 8.5–10.5)
CALCIUM SERPL-MCNC: 9.4 MG/DL (ref 8.5–10.5)
CHLORIDE SERPL-SCNC: 103 MMOL/L (ref 96–112)
CHOLEST SERPL-MCNC: 227 MG/DL (ref 100–199)
CO2 SERPL-SCNC: 27 MMOL/L (ref 20–33)
CREAT SERPL-MCNC: 0.74 MG/DL (ref 0.5–1.4)
EOSINOPHIL # BLD AUTO: 0.26 K/UL (ref 0–0.51)
EOSINOPHIL NFR BLD: 4.6 % (ref 0–6.9)
ERYTHROCYTE [DISTWIDTH] IN BLOOD BY AUTOMATED COUNT: 45.8 FL (ref 35.9–50)
EST. AVERAGE GLUCOSE BLD GHB EST-MCNC: 154 MG/DL
FASTING STATUS PATIENT QL REPORTED: NORMAL
GFR SERPLBLD CREATININE-BSD FMLA CKD-EPI: 95 ML/MIN/1.73 M 2
GLOBULIN SER CALC-MCNC: 4.1 G/DL (ref 1.9–3.5)
GLUCOSE SERPL-MCNC: 107 MG/DL (ref 65–99)
HBA1C MFR BLD: 7 % (ref 4–5.6)
HCT VFR BLD AUTO: 44.1 % (ref 37–47)
HDLC SERPL-MCNC: 46 MG/DL
HGB BLD-MCNC: 14.1 G/DL (ref 12–16)
IMM GRANULOCYTES # BLD AUTO: 0.01 K/UL (ref 0–0.11)
IMM GRANULOCYTES NFR BLD AUTO: 0.2 % (ref 0–0.9)
LDLC SERPL CALC-MCNC: 155 MG/DL
LYMPHOCYTES # BLD AUTO: 2.53 K/UL (ref 1–4.8)
LYMPHOCYTES NFR BLD: 44.5 % (ref 22–41)
MCH RBC QN AUTO: 29 PG (ref 27–33)
MCHC RBC AUTO-ENTMCNC: 32 G/DL (ref 32.2–35.5)
MCV RBC AUTO: 90.6 FL (ref 81.4–97.8)
MONOCYTES # BLD AUTO: 0.43 K/UL (ref 0–0.85)
MONOCYTES NFR BLD AUTO: 7.6 % (ref 0–13.4)
NEUTROPHILS # BLD AUTO: 2.42 K/UL (ref 1.82–7.42)
NEUTROPHILS NFR BLD: 42.6 % (ref 44–72)
NRBC # BLD AUTO: 0 K/UL
NRBC BLD-RTO: 0 /100 WBC (ref 0–0.2)
NT-PROBNP SERPL IA-MCNC: <36 PG/ML (ref 0–125)
PLATELET # BLD AUTO: 182 K/UL (ref 164–446)
PMV BLD AUTO: 13.7 FL (ref 9–12.9)
POTASSIUM SERPL-SCNC: 4.6 MMOL/L (ref 3.6–5.5)
PROT SERPL-MCNC: 8.2 G/DL (ref 6–8.2)
RBC # BLD AUTO: 4.87 M/UL (ref 4.2–5.4)
SODIUM SERPL-SCNC: 139 MMOL/L (ref 135–145)
T3FREE SERPL-MCNC: 3.31 PG/ML (ref 2–4.4)
T4 FREE SERPL-MCNC: 1.19 NG/DL (ref 0.93–1.7)
TRIGL SERPL-MCNC: 130 MG/DL (ref 0–149)
TSH SERPL-ACNC: 1.29 UIU/ML (ref 0.38–5.33)
WBC # BLD AUTO: 5.7 K/UL (ref 4.8–10.8)

## 2025-08-30 PROCEDURE — 85025 COMPLETE CBC W/AUTO DIFF WBC: CPT

## 2025-08-30 PROCEDURE — 83036 HEMOGLOBIN GLYCOSYLATED A1C: CPT

## 2025-08-30 PROCEDURE — 80053 COMPREHEN METABOLIC PANEL: CPT

## 2025-08-30 PROCEDURE — 84443 ASSAY THYROID STIM HORMONE: CPT

## 2025-08-30 PROCEDURE — 83880 ASSAY OF NATRIURETIC PEPTIDE: CPT

## 2025-08-30 PROCEDURE — 84439 ASSAY OF FREE THYROXINE: CPT

## 2025-08-30 PROCEDURE — 36415 COLL VENOUS BLD VENIPUNCTURE: CPT

## 2025-08-30 PROCEDURE — 80061 LIPID PANEL: CPT

## 2025-08-30 PROCEDURE — 84481 FREE ASSAY (FT-3): CPT
